# Patient Record
Sex: FEMALE | Race: WHITE | NOT HISPANIC OR LATINO | Employment: FULL TIME | URBAN - METROPOLITAN AREA
[De-identification: names, ages, dates, MRNs, and addresses within clinical notes are randomized per-mention and may not be internally consistent; named-entity substitution may affect disease eponyms.]

---

## 2017-04-02 DIAGNOSIS — E03.9 HYPOTHYROIDISM: ICD-10-CM

## 2017-04-03 NOTE — TELEPHONE ENCOUNTER
synthroid     Last Written Prescription Date: 03/16/16  Last Quantity: 90, # refills: 3  Last Office Visit with [facility], [facility] or [facility] prescribing provider: 03/16/16        TSH   Date Value Ref Range Status   03/16/2016 3.98 0.40 - 4.00 mU/L Final

## 2017-04-04 RX ORDER — LEVOTHYROXINE SODIUM 75 UG/1
TABLET ORAL
Qty: 30 TABLET | Refills: 0 | Status: SHIPPED | OUTPATIENT
Start: 2017-04-04 | End: 2017-04-19

## 2017-04-04 NOTE — TELEPHONE ENCOUNTER
Medication is being filled for 1 time refill only due to:  Patient needs labs TSH. Patient needs to be seen because it has been more than one year since last visit. please call to schedule lab and OV.    Floridalma Downing, RN, BSN

## 2017-04-05 NOTE — TELEPHONE ENCOUNTER
Patient returned call and was given message below. Patient states she will call back to make an appointment.    Thank you Vani

## 2017-04-12 NOTE — PROGRESS NOTES
"   SUBJECTIVE:     CC: Gretta Mcmahan is an 32 year old woman who presents for preventive health visit.     Healthy Habits:    Do you get at least three servings of calcium containing foods daily (dairy, green leafy vegetables, etc.)? {YES/NO, DAIRY INTAKE:352947::\"yes\"}    Amount of exercise or daily activities, outside of work: {AMOUNT EXERCISE:967819}    Problems taking medications regularly {Yes /No default:301288::\"No\"}    Medication side effects: {Yes /No default.:275999::\"No\"}    Have you had an eye exam in the past two years? {YESNOBLANK:731237}    Do you see a dentist twice per year? {YESNOBLANK:364200}    Do you have sleep apnea, excessive snoring or daytime drowsiness?{YESNOBLANK:324283}    {Outside tests to abstract? :645386}    {additional problems to add:557120}    Today's PHQ-2 Score:   PHQ-2 ( 1999 Pfizer) 3/16/2016   Q1: Little interest or pleasure in doing things 0   Q2: Feeling down, depressed or hopeless 0   PHQ-2 Score 0     {PHQ-2 LOOK IN ASSESSMENTS :930960}  Abuse: Current or Past(Physical, Sexual or Emotional)- {YES/NO/NA:081417}  Do you feel safe in your environment - {YES/NO/NA:392231}    Social History   Substance Use Topics     Smoking status: Never Smoker     Smokeless tobacco: Never Used     Alcohol use 0.0 oz/week     0 Standard drinks or equivalent per week      Comment: occasional     {ETOH AUDIT:112368}    Recent Labs   Lab Test  03/16/16   1120   CHOL  162   HDL  63   LDL  85   TRIG  68   NHDL  99       Reviewed orders with patient.  Reviewed health maintenance and updated orders accordingly - {Yes/No:687964::\"Yes\"}    Mammo Decision Support:  {Mammo:638584}    Pertinent mammograms are reviewed under the imaging tab.  History of abnormal Pap smear: {PAP HX:571677}    Reviewed and updated as needed this visit by clinical staff         Reviewed and updated as needed this visit by Provider        {HISTORY OPTIONS:230307}    ROS:  {FEMALE PREVENTATIVE " "ROS:855387}    {CHRONICPROBDATA:996225}  OBJECTIVE:     There were no vitals taken for this visit.  EXAM:  {Exam Choices:744738}    ASSESSMENT/PLAN:     {Diag Picklist:171115}    COUNSELING:   {FEMALE COUNSELING MESSAGES:753489::\"Reviewed preventive health counseling, as reflected in patient instructions\"}    {Blood Pressure Screenin}     reports that she has never smoked. She has never used smokeless tobacco.  {Tobacco Cessation needed for ACO -- Delete if patient is a non-smoker:566449}  Estimated body mass index is 22.57 kg/(m^2) as calculated from the following:    Height as of 16: 6' 0.25\" (1.835 m).    Weight as of 16: 167 lb 9.6 oz (76 kg).   {Weight Management Plan needed for ACO:152170}    Counseling Resources:  ATP IV Guidelines  Pooled Cohorts Equation Calculator  Breast Cancer Risk Calculator  FRAX Risk Assessment  ICSI Preventive Guidelines  Dietary Guidelines for Americans, 2010  USDA's MyPlate  ASA Prophylaxis  Lung CA Screening    TABBY KOHLI MD, MD  [facility] [place]  "

## 2017-04-19 ENCOUNTER — OFFICE VISIT (OUTPATIENT)
Dept: FAMILY MEDICINE | Facility: CLINIC | Age: 32
End: 2017-04-19
Payer: COMMERCIAL

## 2017-04-19 VITALS
DIASTOLIC BLOOD PRESSURE: 70 MMHG | HEIGHT: 72 IN | HEART RATE: 60 BPM | TEMPERATURE: 98.1 F | WEIGHT: 163.3 LBS | BODY MASS INDEX: 22.12 KG/M2 | SYSTOLIC BLOOD PRESSURE: 118 MMHG | RESPIRATION RATE: 16 BRPM

## 2017-04-19 DIAGNOSIS — E03.4 HYPOTHYROIDISM DUE TO ACQUIRED ATROPHY OF THYROID: ICD-10-CM

## 2017-04-19 DIAGNOSIS — Z00.00 ENCOUNTER FOR ROUTINE ADULT HEALTH EXAMINATION WITHOUT ABNORMAL FINDINGS: Primary | ICD-10-CM

## 2017-04-19 DIAGNOSIS — E04.1 THYROID NODULE: ICD-10-CM

## 2017-04-19 LAB — TSH SERPL DL<=0.005 MIU/L-ACNC: 3.29 MU/L (ref 0.4–4)

## 2017-04-19 PROCEDURE — 84443 ASSAY THYROID STIM HORMONE: CPT | Performed by: FAMILY MEDICINE

## 2017-04-19 PROCEDURE — 99395 PREV VISIT EST AGE 18-39: CPT | Performed by: FAMILY MEDICINE

## 2017-04-19 PROCEDURE — 36415 COLL VENOUS BLD VENIPUNCTURE: CPT | Performed by: FAMILY MEDICINE

## 2017-04-19 RX ORDER — CIDER VINEGAR 300 MG
TABLET ORAL
COMMUNITY
End: 2019-04-19

## 2017-04-19 RX ORDER — LEVOTHYROXINE SODIUM 75 UG/1
75 TABLET ORAL DAILY
Qty: 90 TABLET | Refills: 3 | Status: SHIPPED | OUTPATIENT
Start: 2017-04-19 | End: 2018-04-18

## 2017-04-19 ASSESSMENT — PAIN SCALES - GENERAL: PAINLEVEL: NO PAIN (0)

## 2017-04-19 NOTE — NURSING NOTE
"Chief Complaint   Patient presents with     Physical     Panel Management     CUBA        Initial /70 (BP Location: Left arm, Patient Position: Chair, Cuff Size: Adult Regular)  Pulse 60  Temp 98.1  F (36.7  C) (Temporal)  Resp 16  Ht 6' 0.48\" (1.841 m)  Wt 163 lb 4.8 oz (74.1 kg)  LMP 04/11/2017  BMI 21.86 kg/m2 Estimated body mass index is 21.86 kg/(m^2) as calculated from the following:    Height as of this encounter: 6' 0.48\" (1.841 m).    Weight as of this encounter: 163 lb 4.8 oz (74.1 kg).  Medication Reconciliation: complete  Iwona Segovia CMA (AAMA)    "

## 2017-04-19 NOTE — MR AVS SNAPSHOT
After Visit Summary   4/19/2017    Gretta Mcmahan    MRN: [id number]           Patient Information     Date Of Birth          1985        Visit Information        Provider Department      4/19/2017 10:40 AM Lavinia Roque MD [facility]        Today's Diagnoses     Encounter for routine adult health examination without abnormal findings    -  1    Thyroid nodule        Hypothyroidism due to acquired atrophy of thyroid          Care Instructions      Preventive Health Recommendations  Female Ages 26 - 39  Yearly exam:   See your health care provider every year in order to    Review health changes.     Discuss preventive care.      Review your medicines if you your doctor has prescribed any.    Until age 30: Get a Pap test every three years (more often if you have had an abnormal result).    After age 30: Talk to your doctor about whether you should have a Pap test every 3 years or have a Pap test with HPV screening every 5 years.   You do not need a Pap test if your uterus was removed (hysterectomy) and you have not had cancer.  You should be tested each year for STDs (sexually transmitted diseases), if you're at risk.   Talk to your provider about how often to have your cholesterol checked.  If you are at risk for diabetes, you should have a diabetes test (fasting glucose).  Shots: Get a flu shot each year. Get a tetanus shot every 10 years.   Nutrition:     Eat at least 5 servings of fruits and vegetables each day.    Eat whole-grain bread, whole-wheat pasta and brown rice instead of white grains and rice.    Talk to your provider about Calcium and Vitamin D.     Lifestyle    Exercise at least 150 minutes a week (30 minutes a day, 5 days of the week). This will help you control your weight and prevent disease.    Limit alcohol to one drink per day.    No smoking.     Wear sunscreen to prevent skin cancer.    See your dentist every six months for an exam and cleaning.           "Follow-ups after your visit        Who to contact     If you have questions or need follow up information about today's clinic visit or your schedule please contact [facility] directly at [phone].  Normal or non-critical lab and imaging results will be communicated to you by MyChart, letter or phone within 4 business days after the clinic has received the results. If you do not hear from us within 7 days, please contact the clinic through MyChart or phone. If you have a critical or abnormal lab result, we will notify you by phone as soon as possible.  Submit refill requests through [email or web address] or call your pharmacy and they will forward the refill request to us. Please allow 3 business days for your refill to be completed.          Additional Information About Your Visit        [employer] Information     [email or web address] gives you secure access to your electronic health record. If you see a primary care provider, you can also send messages to your care team and make appointments. If you have questions, please call your primary care clinic.  If you do not have a primary care provider, please call [phone] and they will assist you.        Care EveryWhere ID     This is your Care EveryWhere ID. This could be used by other organizations to access your [facility] medical records  [id number]        Your Vitals Were     Pulse Temperature Respirations Height Last Period BMI (Body Mass Index)    60 98.1  F (36.7  C) (Temporal) 16 6' 0.48\" (1.841 m) 04/11/2017 21.86 kg/m2       Blood Pressure from Last 3 Encounters:   04/19/17 118/70   03/16/16 124/76    Weight from Last 3 Encounters:   04/19/17 163 lb 4.8 oz (74.1 kg)   04/07/16 167 lb 9.6 oz (76 kg)   03/16/16 162 lb 6.4 oz (73.7 kg)              We Performed the Following     TSH with free T4 reflex        Primary Care Provider    No Pcp Confirmed       No address on file        Thank you!     Thank you for choosing [facility]  for your care. Our goal is " always to provide you with excellent care. Hearing back from our patients is one way we can continue to improve our services. Please take a few minutes to complete the written survey that you may receive in the mail after your visit with us. Thank you!             Your Updated Medication List - Protect others around you: Learn how to safely use, store and throw away your medicines at www.disposemymeds.org.          This list is accurate as of: 4/19/17 11:07 AM.  Always use your most recent med list.                   Brand Name Dispense Instructions for use    CALCIUM-VITAMIN D PO          Fish Oil 1000 MG Cpdr          IRON SUPPLEMENT PO      Take 325 mg by mouth daily       levothyroxine 75 MCG tablet    SYNTHROID/LEVOTHROID    30 tablet    TAKE ONE TABLET BY MOUTH ONCE DAILY

## 2017-04-19 NOTE — PROGRESS NOTES
SUBJECTIVE:     CC: Gretta Mcmahan is an 32 year old woman who presents for preventive health visit.     Physical   Annual:     Getting at least 3 servings of Calcium per day::  Yes    Bi-annual eye exam::  NO    Dental care twice a year::  Yes    Sleep apnea or symptoms of sleep apnea::  None    Diet::  Regular (no restrictions)    Frequency of exercise::  4-5 days/week    Duration of exercise::  30-45 minutes    Taking medications regularly::  Yes    Medication side effects::  None    Additional concerns today::  No        No other concerns    Today's PHQ-2 Score:   PHQ-2 ( 1999 Pfizer) 4/19/2017   Q1: Little interest or pleasure in doing things 0   Q2: Feeling down, depressed or hopeless 0   PHQ-2 Score 0   Little interest or pleasure in doing things -   Feeling down, depressed or hopeless -   PHQ-2 Score -       Abuse: Current or Past(Physical, Sexual or Emotional)- No  Do you feel safe in your environment - Yes    Social History   Substance Use Topics     Smoking status: Never Smoker     Smokeless tobacco: Never Used     Alcohol use 0.0 oz/week      Comment: occasional     The patient does not drink >3 drinks per day nor >7 drinks per week.    Recent Labs   Lab Test  03/16/16   1120   CHOL  162   HDL  63   LDL  85   TRIG  68   NHDL  99       Reviewed orders with patient.  Reviewed health maintenance and updated orders accordingly - Yes    Mammo Decision Support:  Mammogram not appropriate for this patient based on age.    Pertinent mammograms are reviewed under the imaging tab.  History of abnormal Pap smear: NO - age 30-65 PAP every 5 years with negative HPV co-testing recommended    Reviewed and updated as needed this visit by clinical staff  Tobacco  Allergies  Med Hx  Surg Hx  Fam Hx  Soc Hx        Reviewed and updated as needed this visit by Provider        HYPOTHYROIDISM - doing well. No fatigue. No weight changes. No palpitations. Due for labs.     THYROID NODULE- patient has a thyroid nodule.  "She was evaluated by Dr. Hawkins 4/2016. Recommended an ultrasound in 2 years for recheck. Patient denies any concerns.     ROS:  C: NEGATIVE for fever, chills, change in weight  I: NEGATIVE for worrisome rashes, moles or lesions  E: NEGATIVE for vision changes or irritation  ENT: NEGATIVE for ear, mouth and throat problems  R: NEGATIVE for significant cough or SOB  B: NEGATIVE for masses, tenderness or discharge  CV: NEGATIVE for chest pain, palpitations or peripheral edema  GI: NEGATIVE for nausea, abdominal pain, heartburn, or change in bowel habits  : NEGATIVE for unusual urinary or vaginal symptoms. Periods are regular.  M: NEGATIVE for significant arthralgias or myalgia  N: NEGATIVE for weakness, dizziness or paresthesias  P: NEGATIVE for changes in mood or affect    Problem list, Medication list, Allergies, and Medical/Social/Surgical histories reviewed in [facility] and updated as appropriate.  OBJECTIVE:     /70 (BP Location: Left arm, Patient Position: Chair, Cuff Size: Adult Regular)  Pulse 60  Temp 98.1  F (36.7  C) (Temporal)  Resp 16  Ht 6' 0.48\" (1.841 m)  Wt 163 lb 4.8 oz (74.1 kg)  LMP 04/11/2017  BMI 21.86 kg/m2  EXAM:  GENERAL: healthy, alert and no distress  EYES: Eyes grossly normal to inspection, PERRL and conjunctivae and sclerae normal  HENT: ear canals and TM's normal, nose and mouth without ulcers or lesions  NECK: no adenopathy, no asymmetry, masses, or scars and thyroid normal to palpation  RESP: lungs clear to auscultation - no rales, rhonchi or wheezes  BREAST: normal without masses, tenderness or nipple discharge and no palpable axillary masses or adenopathy  CV: regular rate and rhythm, normal S1 S2, no S3 or S4, no murmur, click or rub, no peripheral edema and peripheral pulses strong  ABDOMEN: soft, nontender, no hepatosplenomegaly, no masses and bowel sounds normal  MS: no gross musculoskeletal defects noted, no edema  SKIN: no suspicious lesions or rashes  NEURO: Normal " "strength and tone, mentation intact and speech normal  PSYCH: mentation appears normal, affect normal/bright    ASSESSMENT/PLAN:     1. Encounter for routine adult health examination without abnormal findings  See counseling messages     2. Thyroid nodule  Recheck thyroid ultrasound 3/2018.   Call for any concerns.     3. Hypothyroidism due to acquired atrophy of thyroid  Awaiting results. Dose adjustment as needed.    - TSH with free T4 reflex    COUNSELING:  Reviewed preventive health counseling, as reflected in patient instructions       Regular exercise       Healthy diet/nutrition       Contraception    ++consider nexplanon for contraception. She will see Floridalma Scott if she wishes to proceed.      reports that she has never smoked. She has never used smokeless tobacco.    Estimated body mass index is 21.86 kg/(m^2) as calculated from the following:    Height as of this encounter: 6' 0.48\" (1.841 m).    Weight as of this encounter: 163 lb 4.8 oz (74.1 kg).       Counseling Resources:  ATP IV Guidelines  Pooled Cohorts Equation Calculator  Breast Cancer Risk Calculator  FRAX Risk Assessment  ICSI Preventive Guidelines  Dietary Guidelines for Americans, 2010  USDA's MyPlate  ASA Prophylaxis  Lung CA Screening    TABBY KOHLI MD, MD  [facility]  "

## 2017-07-06 ENCOUNTER — VIRTUAL VISIT (OUTPATIENT)
Dept: FAMILY MEDICINE | Facility: OTHER | Age: 32
End: 2017-07-06

## 2017-07-11 NOTE — PROGRESS NOTES
"Date:   Clinician: Keren Mckenna  Clinician NPI: [id number]  Patient: IGOR DELA CRUZ  Patient : 1985  Patient Address: [address] [place] [address]  Patient Phone: [phone]  Visit Protocol: UTI  Patient Summary:  IGOR is a 32 year old ( : 1985 ) female who initiated a Visit for a presumed bladder infection. When asked the question \"Please sign me up to receive news, health information and promotions. \", IGOR responded \"No\".    Her symptoms began yesterday and consist of urinary frequency, foul smelling urine, urinary incontinence, urgency, and dysuria.   Symptom Details   Urinary Frequency: Several times each hour    She denies flank pain, hematuria, vaginal discharge, abdominal pain, recent antibiotic use, nausea, loss of appetite, chills, fever, hesitation, and vomiting. IGOR has never had kidney stones. She has not been hospitalized, been a patient in a nursing home, or had a catheter in the past two weeks. She denies risk factors for sexually transmitted infections.   IGOR has not had any UTIs in the past 12 months. Her current symptoms are similar to the previous UTI symptoms. She took an antibiotic for her last infection but does not remember which one.   IGOR does not get yeast infections when she takes antibiotics.   She states she is not pregnant and denies breastfeeding. She has menstruated in the past month.   She does NOT smoke or use smokeless tobacco.   MEDICATIONS:  Thyroid  , ALLERGIES:  NKDA   Clinician Response:  Dear IGOR,  Based on the information you have provided, you likely have a bladder infection, also called an acute urinary tract infection (UTI).   To treat your infection, I am prescribing:   Nitrofurantoin (Macrobid). Swallow one (1) tablet twice a day for 5 days. Take the tablet with food. Continue taking the tablets even if you feel better before all the medication is gone. There is no refill with this prescription.   Some people " develop allergies to antibiotics. If you notice a new rash, significant swelling, or difficulty breathing, stop the medication immediately and go into a clinic for physical evaluation.   Unless you are allergic to the following over-the-counter medication, I recommend using phenazopyridine (AZO, Uristat, or store brand) oral tablet to treat your discomfort with urination. Swallow two (2) tablets three times a day for up to 2 days. Take the pills with a full glass of water after a meal. You will notice that this medication adds an orange/red color to your urine, which may stain fabric. Your contact lenses may also stain if handled after touching the tablets. If your skin or the whites of your eyes develop a yellowish color, it may indicate that your kidneys are not correctly removing the medication. Although this is uncommon, stop using the medication and immediately contact your clinic if this happens. This is an over-the-counter medication that does not require a prescription.   To help treat your current UTI and prevent future occurrences, remember to:     Drink 8-10, 8-ounce glasses of water daily.    Urinate after sexual intercourse.    Wipe front to back after using the bathroom.     Some women may develop a yeast infection as a side effect of taking antibiotics. If you notice symptoms of a yeast infection, [facility] can help treat that condition as well. Simply log in and complete another Visit, which will cover all of the necessary questions to determine the best treatment for you.   You should visit a clinic for a follow-up visit if your symptoms do not improve in 1-2 days or if you experience another urinary tract infection soon after completing this treatment.  If you become pregnant during this course of treatment, stop taking the medication and contact your primary care provider.   Diagnosis: Acute Uncomplicated Bladder Infection  Diagnosis ICD: N39.0  Additional Clinician Notes: Take all of your  antibiotics. You may take the over the counter Azo to help with symptoms for a day or 2, remember this will turn your urine orange. Drink plenty of water. Cranberry juice may help.    Prescription: nitrofurantoin (Macrobid) 100mg oral tablet 10 tablets, 5 days supply. Take one tablet by mouth two times a day for 5 days. Refills: 0, Refill as needed: no, Allow substitutions: yes

## 2017-11-20 NOTE — PROGRESS NOTES
SUBJECTIVE:                                                    Gretat Mcmahan is a 32 year old female who presents to clinic today for the following health issues:          HPI     Patient reports for Nexplanon consultation and placement. She is sexually active, and had sexual intercourse on 11-. Patient is not on birth control. She is right handed. Patient was referred by Dr. Roque. Patient would like to use this for birth control and not for period control. Her last menstrual period was on 11-, and she is having regular periods. She denies having an allergy to Lidocaine or Novocain.     Immunizations  Patient got her Flu shot already.     Social History  She got  6 months ago.     Problem list and histories reviewed & adjusted, as indicated.  Additional history: as documented      Patient Active Problem List   Diagnosis     Hypothyroidism due to acquired atrophy of thyroid     CARDIOVASCULAR SCREENING; LDL GOAL LESS THAN 160     Thyroid nodule     Past Surgical History:   Procedure Laterality Date     DENTAL SURGERY         Social History   Substance Use Topics     Smoking status: Never Smoker     Smokeless tobacco: Never Used     Alcohol use 0.0 oz/week      Comment: occasional     Family History   Problem Relation Age of Onset     DIABETES Maternal Grandmother      DIABETES Paternal Grandmother      Hyperlipidemia Mother      Genetic Disorder Mother      prothrombin gene mutation     Thyroid Disease Mother      Genetic Disorder Sister      carrier         Current Outpatient Prescriptions   Medication Sig Dispense Refill     etonogestrel (IMPLANON/NEXPLANON) 68 MG IMPL 1 each (68 mg) by Subdermal route continuous  0     Omega-3 Fatty Acids (FISH OIL) 1000 MG CPDR        levothyroxine (SYNTHROID/LEVOTHROID) 75 MCG tablet Take 1 tablet (75 mcg) by mouth daily 90 tablet 3     Ferrous Sulfate (IRON SUPPLEMENT PO) Take 325 mg by mouth daily       CALCIUM-VITAMIN D PO        No Known  "Allergies      ROS:  Constitutional, neuro, ENT, endocrine, pulmonary, cardiac, gastrointestinal, genitourinary, musculoskeletal, integument and psychiatric systems are negative, except as otherwise noted.    This document serves as a record of the services and decisions personally performed and made by Floridalma Scott DNP. It was created on her behalf by Sarah Lua, a trained medical scribe. The creation of this document is based on the provider's statements to the medical scribe.  Sarah Lua 10:17 AM November 27, 2017    OBJECTIVE:                                                    /79  Pulse 68  Temp 98.2  F (36.8  C) (Oral)  Resp 16  Ht 6' 0.48\" (1.841 m)  Wt 165 lb 1.6 oz (74.9 kg)  LMP 11/16/2017 (Exact Date)  SpO2 99%  BMI 22.1 kg/m2  Body mass index is 22.1 kg/(m^2).  GENERAL APPEARANCE: healthy, alert and no distress  MS: extremities normal- no gross deformities noted  SKIN: no suspicious lesions or rashes  NEURO: Normal strength and tone, mentation intact and speech normal  PSYCH: mentation appears normal and affect normal/bright    Diagnostic test results:  Diagnostic Test Results:  Results for orders placed or performed in visit on 11/27/17 (from the past 24 hour(s))   Beta HCG qual IFA urine - [facility] and [facility] [place]   Result Value Ref Range    Beta HCG Qual IFA Urine Negative NEG^Negative             ASSESSMENT/PLAN:                                                        ICD-10-CM    1. Nexplanon insertion Z30.017 Beta HCG qual IFA urine - [facility] and [place]     ETONOGESTREL IMPLANT SYSTEM     etonogestrel (IMPLANON/NEXPLANON) 68 MG IMPL     INSERTION NON-BIODEGRADABLE DRUG DELIVERY IMPLANT        Consulted patient on Nexplanon placement. Written consent was given by patient for Nexplanon placement today. Beta HCG qual IFA urine analysis completed today, and results were negative.     Procedure: Nexplanon inserted easily.   Appropriate left arm cleansing with betadine or alcohol, then " local anaesthesia administered to site: lidocaine 1% with epinephrine.  Device placed 8 cm proximal from medial epicondyle and device inserted to 4 cm, and completely embedded and not visible at insertion site. Palpation for placement confirmed. Pressure dressing applied.  Instructions reviewed with patient regarding checking the wound for bleeding and signs/symptoms of infection.   Barrier method for 1 weeks, device expires in 3 years.  Return to clinic with any new or worsening symptoms, and as needed.    Advised patient to use condoms. Warned patient that Nexplanon does not given her protection from STDs.     Directed patient to take a pregnancy test in 2 weeks, return to clinic if test is positive. Discussed risk if pregnant and on Nexplanon.     Follow up with Provider - Return to clinic with any new or worsening symptoms, and as needed.     All questions invited, asked and answered to the patient's apparent satisfaction.  Patient agrees to plan.     The information in this document, created by the medical scribe for me, accurately reflects the services I personally performed and the decisions made by me. I have reviewed and approved this document for accuracy prior to leaving the patient care area.  November 27, 2017 10:44 AM    MICHELET Gandara [facility] VANNESA

## 2017-11-27 ENCOUNTER — OFFICE VISIT (OUTPATIENT)
Dept: FAMILY MEDICINE | Facility: CLINIC | Age: 32
End: 2017-11-27
Payer: COMMERCIAL

## 2017-11-27 VITALS
DIASTOLIC BLOOD PRESSURE: 79 MMHG | SYSTOLIC BLOOD PRESSURE: 126 MMHG | WEIGHT: 165.1 LBS | HEIGHT: 72 IN | BODY MASS INDEX: 22.36 KG/M2 | HEART RATE: 68 BPM | OXYGEN SATURATION: 99 % | TEMPERATURE: 98.2 F | RESPIRATION RATE: 16 BRPM

## 2017-11-27 DIAGNOSIS — Z30.017 NEXPLANON INSERTION: Primary | ICD-10-CM

## 2017-11-27 LAB — BETA HCG QUAL IFA URINE: NEGATIVE

## 2017-11-27 PROCEDURE — 99207 ZZC NO CHARGE LOS: CPT | Mod: 25 | Performed by: NURSE PRACTITIONER

## 2017-11-27 PROCEDURE — 84703 CHORIONIC GONADOTROPIN ASSAY: CPT | Performed by: NURSE PRACTITIONER

## 2017-11-27 PROCEDURE — 11981 INSERTION DRUG DLVR IMPLANT: CPT | Performed by: NURSE PRACTITIONER

## 2017-11-27 ASSESSMENT — PAIN SCALES - GENERAL: PAINLEVEL: NO PAIN (0)

## 2017-11-27 NOTE — NURSING NOTE
"Chief Complaint   Patient presents with     IUD     Panel Management     flu shot       Initial /79  Pulse 68  Temp 98.2  F (36.8  C) (Oral)  Resp 16  Ht 6' 0.48\" (1.841 m)  Wt 165 lb 1.6 oz (74.9 kg)  LMP 11/16/2017 (Exact Date)  SpO2 99%  BMI 22.1 kg/m2 Estimated body mass index is 22.1 kg/(m^2) as calculated from the following:    Height as of this encounter: 6' 0.48\" (1.841 m).    Weight as of this encounter: 165 lb 1.6 oz (74.9 kg).  Medication Reconciliation: complete     Chief Complaint   Patient presents with     IUD     Panel Management     flu shot       Initial /79  Pulse 68  Temp 98.2  F (36.8  C) (Oral)  Resp 16  Ht 6' 0.48\" (1.841 m)  Wt 165 lb 1.6 oz (74.9 kg)  LMP 11/16/2017 (Exact Date)  SpO2 99%  BMI 22.1 kg/m2 Estimated body mass index is 22.1 kg/(m^2) as calculated from the following:    Height as of this encounter: 6' 0.48\" (1.841 m).    Weight as of this encounter: 165 lb 1.6 oz (74.9 kg).  Medication Reconciliation: complete      "

## 2017-11-27 NOTE — MR AVS SNAPSHOT
After Visit Summary   11/27/2017    Gretta Muñoz    MRN: [id number]           Patient Information     Date Of Birth          1985        Visit Information        Provider Department      11/27/2017 9:40 AM Floridalma Scott APRN CNP [facility]        Today's Diagnoses     Nexplanon insertion    -  1      Care Instructions    Take at home pregnancy test in 2 weeks, return to clinic if test is positive or if having new or worsening symptoms.           Follow-ups after your visit        Follow-up notes from your care team     Return if symptoms worsen or fail to improve.      Who to contact     If you have questions or need follow up information about today's clinic visit or your schedule please contact [facility] directly at [phone].  Normal or non-critical lab and imaging results will be communicated to you by [employer], letter or phone within 4 business days after the clinic has received the results. If you do not hear from us within 7 days, please contact the clinic through [employer] or phone. If you have a critical or abnormal lab result, we will notify you by phone as soon as possible.  Submit refill requests through [employer] or call your pharmacy and they will forward the refill request to us. Please allow 3 business days for your refill to be completed.          Additional Information About Your Visit        MyChart Information     [employer] gives you secure access to your electronic health record. If you see a primary care provider, you can also send messages to your care team and make appointments. If you have questions, please call your primary care clinic.  If you do not have a primary care provider, please call [phone] and they will assist you.        Care EveryWhere ID     This is your Care EveryWhere ID. This could be used by other organizations to access your [place] medical records  [id number]        Your Vitals Were     Pulse Temperature Respirations  "Height Last Period Pulse Oximetry    68 98.2  F (36.8  C) (Oral) 16 6' 0.48\" (1.841 m) 11/16/2017 (Exact Date) 99%    BMI (Body Mass Index)                   22.1 kg/m2            Blood Pressure from Last 3 Encounters:   11/27/17 126/79   04/19/17 118/70   03/16/16 124/76    Weight from Last 3 Encounters:   11/27/17 165 lb 1.6 oz (74.9 kg)   04/19/17 163 lb 4.8 oz (74.1 kg)   04/07/16 167 lb 9.6 oz (76 kg)              We Performed the Following     Beta HCG qual IFA urine - [facility] and [place]     ETONOGESTREL IMPLANT SYSTEM     INSERTION NON-BIODEGRADABLE DRUG DELIVERY IMPLANT          Today's Medication Changes          These changes are accurate as of: 11/27/17  5:15 PM.  If you have any questions, ask your nurse or doctor.               Start taking these medicines.        Dose/Directions    etonogestrel 68 MG Impl   Commonly known as:  IMPLANON/NEXPLANON   Used for:  Nexplanon insertion   Started by:  Floridalma Scott, MICHELET CNP        Dose:  1 each   1 each (68 mg) by Subdermal route continuous   Refills:  0            Where to get your medicines      Some of these will need a paper prescription and others can be bought over the counter.  Ask your nurse if you have questions.     You don't need a prescription for these medications     etonogestrel 68 MG Impl                Primary Care Provider    None Specified       No primary provider on file.        Equal Access to Services     [facility] : Hadii valdez hensono Sobaltazar, waaxda luqadaha, qaybta kaalmada adeegyada, horacio mehta . So [facility] [phone].    ATENCIÓN: Si habla español, tiene a kovacs disposición servicios gratuitos de asistencia lingüística. Llame al [phone].    We comply with applicable federal civil rights laws and [place] laws. We do not discriminate on the basis of race, color, national origin, age, disability, sex, sexual orientation, or gender identity.            Thank you!     Thank you for choosing " [facility]  for your care. Our goal is always to provide you with excellent care. Hearing back from our patients is one way we can continue to improve our services. Please take a few minutes to complete the written survey that you may receive in the mail after your visit with us. Thank you!             Your Updated Medication List - Protect others around you: Learn how to safely use, store and throw away your medicines at www.disposemymeds.org.          This list is accurate as of: 11/27/17  5:15 PM.  Always use your most recent med list.                   Brand Name Dispense Instructions for use Diagnosis    CALCIUM-VITAMIN D PO           etonogestrel 68 MG Impl    IMPLANON/NEXPLANON     1 each (68 mg) by Subdermal route continuous    Nexplanon insertion       Fish Oil 1000 MG Cpdr           IRON SUPPLEMENT PO      Take 325 mg by mouth daily        levothyroxine 75 MCG tablet    SYNTHROID/LEVOTHROID    90 tablet    Take 1 tablet (75 mcg) by mouth daily    Hypothyroidism due to acquired atrophy of thyroid

## 2018-04-11 NOTE — PROGRESS NOTES
"  SUBJECTIVE:   Gretta Muñoz is a 33 year old female who presents to clinic today for the following health issues:      History of Present Illness     Hypothyroidism:     Since last visit, patient describes the following symptoms::  Fatigue    Diet:  Regular (no restrictions)  Frequency of exercise:  4-5 days/week  Duration of exercise:  30-45 minutes  Taking medications regularly:  Yes  Medication side effects:  None  Additional concerns today:  No    Patient was last seen one year ago for her thyroid check. She is currently on Synthroid 75mcg daily. She reports feeling increased fatigue and irritable. She is unsure if the weather, thyroid, or her Nexplanon is causing her to feel this way. She is also experiencing hot flashes as well. She has a Nexplanon as a contraceptive that was inserted last year and is tolerating it well. She denies feeling lumps in her throat.    Lip \"quiver\":  Patient reports that for the past few months, she noticed her lip \"quivers/vibrates\". She states no one else notices this except for her. This does not occur when she is talking. There is no pain. This happens multiple times per week. She denies any prior injury. She denies changes with her speech or numbness. She did have her routine dental visit two weeks ago and states everything went fine.    Problem list and histories reviewed & adjusted, as indicated.  Additional history: as documented  Patient Active Problem List   Diagnosis     Hypothyroidism due to acquired atrophy of thyroid     CARDIOVASCULAR SCREENING; LDL GOAL LESS THAN 160     Thyroid nodule     Past Surgical History:   Procedure Laterality Date     DENTAL SURGERY         Social History   Substance Use Topics     Smoking status: Never Smoker     Smokeless tobacco: Never Used     Alcohol use 0.0 oz/week      Comment: occasional     Family History   Problem Relation Age of Onset     DIABETES Maternal Grandmother      DIABETES Paternal Grandmother      Hyperlipidemia " "Mother      Genetic Disorder Mother      prothrombin gene mutation     Thyroid Disease Mother      Genetic Disorder Sister      carrier         BP Readings from Last 3 Encounters:   04/18/18 110/78   11/27/17 126/79   04/19/17 118/70    Wt Readings from Last 3 Encounters:   04/18/18 161 lb 11.2 oz (73.3 kg)   11/27/17 165 lb 1.6 oz (74.9 kg)   04/19/17 163 lb 4.8 oz (74.1 kg)                    ROS:  CONSTITUTIONAL: NEGATIVE for fever, chills, change in weight  ENT/MOUTH: NEGATIVE for ear, mouth and throat problems  RESP: NEGATIVE for significant cough or SOB  CV: NEGATIVE for chest pain, palpitations or peripheral edema  ENDOCRINE: NEGATIVE for temperature intolerance, skin/hair changes    This document serves as a record of the services and decisions personally performed and made by Lavinia Roque MD. It was created on her behalf by Jyotsna Tracy, a trained medical scribe. The creation of this document is based the provider's statements to the medical scribe.    Jyotsna Tracy April 18, 2018 10:48 AM    OBJECTIVE:   /78  Pulse 56  Temp 98.8  F (37.1  C) (Temporal)  Resp 16  Ht 6' 0.44\" (1.84 m)  Wt 161 lb 11.2 oz (73.3 kg)  LMP 03/28/2018 (Approximate)  SpO2 100%  Breastfeeding? No  BMI 21.66 kg/m2  Body mass index is 21.66 kg/(m^2).  GENERAL: healthy, alert and no distress  HENT: oropharynx clear and oral mucous membranes moist  NECK: no adenopathy, no asymmetry, masses, or scars and thyroid normal to palpation  RESP: lungs clear to auscultation - no rales, rhonchi or wheezes  CV: regular rate and rhythm, normal S1 S2, no S3 or S4, no murmur, click or rub, no peripheral edema and peripheral pulses strong  NEURO: Normal strength and tone, mentation intact and speech normal    Diagnostic Test Results:  No results found for this or any previous visit (from the past 24 hour(s)).    ASSESSMENT/PLAN:   1. Hypothyroidism due to acquired atrophy of thyroid  Will check TSH labs today and notify with " "results.  Continue Synthroid 75mcg medication.    - TSH with free T4 reflex    2. Thyroid nodule  Will recheck ultrasound - scheduled today.    - US Thyroid; Future    Lip \"quiver\":  Unkown etiology. Reviewed possible causes of this.  Discussed watchful monitoring and discussed that this should resolve on it's own.  Warning signs such as numbness or speech changes discussed    All questions invited, asked and answered to the patient's apparent satisfaction.  Patient agrees to plan.     Follow-up - with PCP as needed.    The information in this document, created by the medical scribe for me, accurately reflects the services I personally performed and the decisions made by me. I have reviewed and approved this document for accuracy prior to leaving the patient care area.    TABBY KOHLI MD  [facility]  Answers for HPI/ROS submitted by the patient on 4/15/2018   PHQ-2 Score: 0    "

## 2018-04-18 ENCOUNTER — OFFICE VISIT (OUTPATIENT)
Dept: FAMILY MEDICINE | Facility: CLINIC | Age: 33
End: 2018-04-18
Payer: COMMERCIAL

## 2018-04-18 VITALS
TEMPERATURE: 98.8 F | WEIGHT: 161.7 LBS | RESPIRATION RATE: 16 BRPM | HEART RATE: 56 BPM | OXYGEN SATURATION: 100 % | SYSTOLIC BLOOD PRESSURE: 110 MMHG | HEIGHT: 72 IN | BODY MASS INDEX: 21.9 KG/M2 | DIASTOLIC BLOOD PRESSURE: 78 MMHG

## 2018-04-18 DIAGNOSIS — E03.4 HYPOTHYROIDISM DUE TO ACQUIRED ATROPHY OF THYROID: Primary | ICD-10-CM

## 2018-04-18 DIAGNOSIS — E04.1 THYROID NODULE: ICD-10-CM

## 2018-04-18 DIAGNOSIS — R68.89 LIP SYMPTOM: ICD-10-CM

## 2018-04-18 LAB — TSH SERPL DL<=0.005 MIU/L-ACNC: 2.64 MU/L (ref 0.4–4)

## 2018-04-18 PROCEDURE — 99214 OFFICE O/P EST MOD 30 MIN: CPT | Performed by: FAMILY MEDICINE

## 2018-04-18 PROCEDURE — 84443 ASSAY THYROID STIM HORMONE: CPT | Performed by: FAMILY MEDICINE

## 2018-04-18 PROCEDURE — 36415 COLL VENOUS BLD VENIPUNCTURE: CPT | Performed by: FAMILY MEDICINE

## 2018-04-18 RX ORDER — LEVOTHYROXINE SODIUM 75 UG/1
75 TABLET ORAL DAILY
Qty: 90 TABLET | Refills: 3 | Status: SHIPPED | OUTPATIENT
Start: 2018-04-18 | End: 2019-04-22

## 2018-04-18 ASSESSMENT — PAIN SCALES - GENERAL: PAINLEVEL: NO PAIN (0)

## 2018-04-18 NOTE — MR AVS SNAPSHOT
After Visit Summary   4/18/2018    Gretta Muñoz    MRN: [id number]           Patient Information     Date Of Birth          1985        Visit Information        Provider Department      4/18/2018 10:20 AM Lavinia Roque MD [facility]        Today's Diagnoses     Hypothyroidism due to acquired atrophy of thyroid    -  1    Thyroid nodule           Follow-ups after your visit        Follow-up notes from your care team     Return in about 1 year (around 4/18/2019) for Recheck.      Your next 10 appointments already scheduled     Apr 30, 2018 10:30 AM CDT   US THYROID with MGUS1, MG US Emb[facility]   [facility]    [id number] [place] [id number] [phone]           Please bring a list of your medicines (including vitamins, minerals and over-the-counter drugs). Also, tell your doctor about any allergies you may have. Wear comfortable clothes and leave your valuables at home.  You do not need to do anything special to prepare for your exam.  Please call the Imaging Department at your exam site with any questions.              Future tests that were ordered for you today     Open Future Orders        Priority Expected Expires Ordered    US Thyroid Routine  4/18/2019 4/18/2018            Who to contact     If you have questions or need follow up information about today's clinic visit or your schedule please contact [facility] directly at [phone].  Normal or non-critical lab and imaging results will be communicated to you by MyChart, letter or phone within 4 business days after the clinic has received the results. If you do not hear from us within 7 days, please contact the clinic through MyChart or phone. If you have a critical or abnormal lab result, we will notify you by phone as soon as possible.  Submit refill requests through [employer] or call your pharmacy and they will forward the refill request to us.  "Please allow 3 business days for your refill to be completed.          Additional Information About Your Visit        MyChart Information     [employer] gives you secure access to your electronic health record. If you see a primary care provider, you can also send messages to your care team and make appointments. If you have questions, please call your primary care clinic.  If you do not have a primary care provider, please call [phone] and they will assist you.        Care EveryWhere ID     This is your Care EveryWhere ID. This could be used by other organizations to access your [place] medical records  [id number]        Your Vitals Were     Pulse Temperature Respirations Height Last Period Pulse Oximetry    56 98.8  F (37.1  C) (Temporal) 16 6' 0.44\" (1.84 m) 03/28/2018 (Approximate) 100%    Breastfeeding? BMI (Body Mass Index)                No 21.66 kg/m2           Blood Pressure from Last 3 Encounters:   04/18/18 110/78   11/27/17 126/79   04/19/17 118/70    Weight from Last 3 Encounters:   04/18/18 161 lb 11.2 oz (73.3 kg)   11/27/17 165 lb 1.6 oz (74.9 kg)   04/19/17 163 lb 4.8 oz (74.1 kg)              We Performed the Following     TSH with free T4 reflex        Primary Care Provider Office Phone # Fax #    Lavinia Roque [phone] [address] [facility] [address]        Equal Access to Services     [facility]: Hadii aad ku hadasho Soomaali, waaxda luqadaha, qaybta kaalmada adeegyada, waxay toya leblanc adehitesh miller'aan ah. So [facility] [phone].    ATENCIÓN: Si habla español, tiene a kovacs disposición servicios gratuitos de asistencia lingüística. Llame al [phone].    We comply with applicable federal civil rights laws and [place] laws. We do not discriminate on the basis of race, color, national origin, age, disability, sex, sexual orientation, or gender identity.            Thank you!     Thank you for choosing [facility]  for your care. Our " goal is always to provide you with excellent care. Hearing back from our patients is one way we can continue to improve our services. Please take a few minutes to complete the written survey that you may receive in the mail after your visit with us. Thank you!             Your Updated Medication List - Protect others around you: Learn how to safely use, store and throw away your medicines at www.disposemymeds.org.          This list is accurate as of 4/18/18 11:02 AM.  Always use your most recent med list.                   Brand Name Dispense Instructions for use Diagnosis    CALCIUM-VITAMIN D PO           etonogestrel 68 MG Impl    IMPLANON/NEXPLANON     1 each (68 mg) by Subdermal route continuous    Nexplanon insertion       Fish Oil 1000 MG Cpdr           IRON SUPPLEMENT PO      Take 325 mg by mouth daily        levothyroxine 75 MCG tablet    SYNTHROID/LEVOTHROID    90 tablet    Take 1 tablet (75 mcg) by mouth daily    Hypothyroidism due to acquired atrophy of thyroid

## 2018-04-30 ENCOUNTER — RADIANT APPOINTMENT (OUTPATIENT)
Dept: ULTRASOUND IMAGING | Facility: CLINIC | Age: 33
End: 2018-04-30
Attending: FAMILY MEDICINE
Payer: COMMERCIAL

## 2018-04-30 DIAGNOSIS — E04.1 THYROID NODULE: ICD-10-CM

## 2018-04-30 PROCEDURE — 76536 US EXAM OF HEAD AND NECK: CPT | Performed by: RADIOLOGY

## 2018-09-28 LAB
ALT SERPL-CCNC: 22 IU/L (ref 12–68)
AST SERPL-CCNC: 21 IU/L (ref 15–37)
CHOLEST SERPL-MCNC: 154 MG/DL
CREAT SERPL-MCNC: 0.87 MG/DL (ref 0.6–1)
GFR SERPL CREATININE-BSD FRML MDRD: >60 ML/MIN
GLUCOSE SERPL-MCNC: 94 MG/DL (ref 70–110)
HDLC SERPL-MCNC: 68 MG/DL
LDLC SERPL CALC-MCNC: 76 MG/DL
POTASSIUM SERPL-SCNC: 4 MMOL/L (ref 3.5–5.1)
TRIGL SERPL-MCNC: 49 MG/DL

## 2019-04-12 NOTE — PROGRESS NOTES
"  SUBJECTIVE:   Gretta Muñoz is a 34 year old female who presents to clinic today for the following health issues:    History of Present Illness     Hypothyroidism:     Since last visit, patient describes the following symptoms::  None    Diet:  Regular (no restrictions)  Frequency of exercise:  4-5 days/week  Duration of exercise:  30-45 minutes  Taking medications regularly:  Yes  Additional concerns today:  No    Thyroid   The patient states that she thinks she is \"alright\". She states that she notices a drop in her temperature after working. She states that her temperature is usually 94-95 degrees Farenheit. She notes that after drinking something warm, her body temperature will go back to normal.   She notes that she lost some weight, but that might be due to her weight training plan.     Left side neck swelling   The patient notes that the left side of neck is swollen. She is unsure if this is due to her thyroid.     Sinus Bradycardia   The patient states that she has Sinus Bradycardia. She notes that this is due to her being athletic.     Additional history: as documented    Reviewed and updated as needed this visit by clinical staff         Reviewed and updated as needed this visit by Provider       Patient Active Problem List   Diagnosis     Hypothyroidism due to acquired atrophy of thyroid     CARDIOVASCULAR SCREENING; LDL GOAL LESS THAN 160     Thyroid nodule     Past Surgical History:   Procedure Laterality Date     DENTAL SURGERY         Social History     Tobacco Use     Smoking status: Never Smoker     Smokeless tobacco: Never Used   Substance Use Topics     Alcohol use: Yes     Alcohol/week: 0.0 oz     Comment: occasional     Family History   Problem Relation Age of Onset     Diabetes Maternal Grandmother      Diabetes Paternal Grandmother      Hyperlipidemia Mother      Genetic Disorder Mother         prothrombin gene mutation     Thyroid Disease Mother      Genetic Disorder Sister         " "carrier         Current Outpatient Medications   Medication Sig Dispense Refill     CALCIUM-VITAMIN D PO        etonogestrel (IMPLANON/NEXPLANON) 68 MG IMPL 1 each (68 mg) by Subdermal route continuous  0     Ferrous Sulfate (IRON SUPPLEMENT PO) Take 325 mg by mouth daily       levothyroxine (SYNTHROID/LEVOTHROID) 75 MCG tablet Take 1 tablet (75 mcg) by mouth daily 90 tablet 3     No Known Allergies  Recent Labs   Lab Test 04/18/18  1107 04/19/17  1313 03/16/16  1120   LDL  --   --  85   HDL  --   --  63   TRIG  --   --  68   TSH 2.64 3.29 3.98      BP Readings from Last 3 Encounters:   04/19/19 112/78   04/18/18 110/78   11/27/17 126/79    Wt Readings from Last 3 Encounters:   04/19/19 75.3 kg (166 lb 1.6 oz)   04/18/18 73.3 kg (161 lb 11.2 oz)   11/27/17 74.9 kg (165 lb 1.6 oz)        ROS:  CONSTITUTIONAL: NEGATIVE for fever, chills, change in weight  ENT/MOUTH: NEGATIVE for ear, mouth and throat problems  RESP: NEGATIVE for significant cough or SOB  CV: NEGATIVE for chest pain, palpitations or peripheral edema  ENDOCRINE: NEGATIVE for temperature intolerance, skin/hair changes; POSITIVE for temperature changes   MS: POSITIVE for swollen neck    This document serves as a record of the services and decisions personally performed and made by Lavinia Roque MD. It was created on her behalf by Edilia Hernandez, a trained medical scribe. The creation of this document is based the provider's statements to the medical scribe.    Edilia Hernandez April 19, 2019 1:01 PM  OBJECTIVE:     /78   Pulse 84   Temp 100.7  F (38.2  C) (Temporal)   Resp 16   Ht 1.854 m (6' 1\")   Wt 75.3 kg (166 lb 1.6 oz)   SpO2 97%   BMI 21.91 kg/m    Body mass index is 21.91 kg/m .  GENERAL: healthy, alert and no distress  HENT: mouth without ulcers or lesions  NECK: no adenopathy, no asymmetry, masses, or scars, mild enlargement of thyroid which is unchanged   RESP: lungs clear to auscultation - no rales, rhonchi or wheezes  CV: " "regular rate and rhythm, normal S1 S2, no S3 or S4, no murmur, click or rub, no peripheral edema and peripheral pulses strong    Diagnostic Test Results:  No results found for this or any previous visit (from the past 24 hour(s)).    ASSESSMENT/PLAN:   1. Hypothyroidism due to acquired atrophy of thyroid  Patient reports temperature after working out is between 94-95 degrees Farenheit.   She reports that \"drinking something warm will bring her temperature back to normal\".  Patient reports losing weight- due to weight training plan.   Patient will be due for Thyroid Ultrasound in 1 year.   Doing well. Well controlled. Tolerating medication.  No change in plan.   Labs have been ordered.  Awaiting results.   Refills will be sent once lab results come in.   Dose adjustment as needed.   - TSH with free T4 reflex    2. History of neck swelling  Patient reports left side of neck is swollen.   Exam showed mild enlargement of thyroid which is unchanged.  Labs have been ordered.  Will notify with results.   If labs return normal, ultrasound of thyroid may be ordered sooner.   - CBC with platelets and differential    Follow up- Come back in 1 year for recheck.     The information in this document, created by the medical scribe for me, accurately reflects the services I personally performed and the decisions made by me. I have reviewed and approved this document for accuracy prior to leaving the patient care area.    TABBY KOHLI MD, MD  [facility]  "

## 2019-04-19 ENCOUNTER — OFFICE VISIT (OUTPATIENT)
Dept: FAMILY MEDICINE | Facility: CLINIC | Age: 34
End: 2019-04-19
Payer: COMMERCIAL

## 2019-04-19 VITALS
HEIGHT: 72 IN | SYSTOLIC BLOOD PRESSURE: 112 MMHG | DIASTOLIC BLOOD PRESSURE: 78 MMHG | OXYGEN SATURATION: 97 % | RESPIRATION RATE: 16 BRPM | TEMPERATURE: 100.7 F | WEIGHT: 166.1 LBS | BODY MASS INDEX: 22.5 KG/M2 | HEART RATE: 84 BPM

## 2019-04-19 DIAGNOSIS — E03.4 HYPOTHYROIDISM DUE TO ACQUIRED ATROPHY OF THYROID: Primary | ICD-10-CM

## 2019-04-19 DIAGNOSIS — Z87.898 HISTORY OF NECK SWELLING: ICD-10-CM

## 2019-04-19 LAB
BASOPHILS # BLD AUTO: 0 10E9/L (ref 0–0.2)
BASOPHILS NFR BLD AUTO: 0.5 %
DIFFERENTIAL METHOD BLD: NORMAL
EOSINOPHIL # BLD AUTO: 0.1 10E9/L (ref 0–0.7)
EOSINOPHIL NFR BLD AUTO: 1.1 %
ERYTHROCYTE [DISTWIDTH] IN BLOOD BY AUTOMATED COUNT: 13.2 % (ref 10–15)
HCT VFR BLD AUTO: 38.9 % (ref 35–47)
HGB BLD-MCNC: 12.6 G/DL (ref 11.7–15.7)
LYMPHOCYTES # BLD AUTO: 1.5 10E9/L (ref 0.8–5.3)
LYMPHOCYTES NFR BLD AUTO: 27.3 %
MCH RBC QN AUTO: 29.5 PG (ref 26.5–33)
MCHC RBC AUTO-ENTMCNC: 32.4 G/DL (ref 31.5–36.5)
MCV RBC AUTO: 91 FL (ref 78–100)
MONOCYTES # BLD AUTO: 0.3 10E9/L (ref 0–1.3)
MONOCYTES NFR BLD AUTO: 5.4 %
NEUTROPHILS # BLD AUTO: 3.6 10E9/L (ref 1.6–8.3)
NEUTROPHILS NFR BLD AUTO: 65.7 %
PLATELET # BLD AUTO: 166 10E9/L (ref 150–450)
RBC # BLD AUTO: 4.27 10E12/L (ref 3.8–5.2)
TSH SERPL DL<=0.005 MIU/L-ACNC: 1.99 MU/L (ref 0.4–4)
WBC # BLD AUTO: 5.5 10E9/L (ref 4–11)

## 2019-04-19 PROCEDURE — 84443 ASSAY THYROID STIM HORMONE: CPT | Performed by: FAMILY MEDICINE

## 2019-04-19 PROCEDURE — 99214 OFFICE O/P EST MOD 30 MIN: CPT | Performed by: FAMILY MEDICINE

## 2019-04-19 PROCEDURE — 36415 COLL VENOUS BLD VENIPUNCTURE: CPT | Performed by: FAMILY MEDICINE

## 2019-04-19 PROCEDURE — 85025 COMPLETE CBC W/AUTO DIFF WBC: CPT | Performed by: FAMILY MEDICINE

## 2019-04-19 ASSESSMENT — MIFFLIN-ST. JEOR: SCORE: 1581.3

## 2019-04-19 ASSESSMENT — PAIN SCALES - GENERAL: PAINLEVEL: NO PAIN (0)

## 2019-04-22 RX ORDER — LEVOTHYROXINE SODIUM 75 UG/1
75 TABLET ORAL DAILY
Qty: 90 TABLET | Refills: 3 | Status: SHIPPED | OUTPATIENT
Start: 2019-04-22 | End: 2020-04-03

## 2019-10-02 ENCOUNTER — TRANSFERRED RECORDS (OUTPATIENT)
Dept: HEALTH INFORMATION MANAGEMENT | Facility: CLINIC | Age: 34
End: 2019-10-02

## 2019-10-02 LAB
ALT SERPL-CCNC: 16 IU/L (ref 12–68)
AST SERPL-CCNC: 21 IU/L (ref 15–37)
CHOLEST SERPL-MCNC: 156 MG/DL
CREAT SERPL-MCNC: 0.86 MG/DL (ref 0.6–1)
GFR SERPL CREATININE-BSD FRML MDRD: >60 ML/MIN
GLUCOSE SERPL-MCNC: 93 MG/DL (ref 70–110)
HDLC SERPL-MCNC: 64 MG/DL
LDLC SERPL CALC-MCNC: 83 MG/DL
POTASSIUM SERPL-SCNC: 4.1 MMOL/L (ref 3.5–5.1)
TRIGL SERPL-MCNC: 45 MG/DL

## 2020-03-10 ENCOUNTER — HEALTH MAINTENANCE LETTER (OUTPATIENT)
Age: 35
End: 2020-03-10

## 2020-04-03 ENCOUNTER — MYC REFILL (OUTPATIENT)
Dept: FAMILY MEDICINE | Facility: CLINIC | Age: 35
End: 2020-04-03

## 2020-04-03 DIAGNOSIS — E03.4 HYPOTHYROIDISM DUE TO ACQUIRED ATROPHY OF THYROID: ICD-10-CM

## 2020-04-06 RX ORDER — LEVOTHYROXINE SODIUM 75 UG/1
75 TABLET ORAL DAILY
Qty: 90 TABLET | Refills: 0 | Status: SHIPPED | OUTPATIENT
Start: 2020-04-06 | End: 2020-07-12

## 2020-04-06 NOTE — TELEPHONE ENCOUNTER
Pending Prescriptions:                       Disp   Refills    levothyroxine (SYNTHROID/LEVOTHROID) 75 M*90 tab*0            Sig: Take 1 tablet (75 mcg) by mouth daily    Prescription approved per [facility] Refill Protocol.    Missy Avila, MSN, RN

## 2020-07-02 DIAGNOSIS — E03.4 HYPOTHYROIDISM DUE TO ACQUIRED ATROPHY OF THYROID: ICD-10-CM

## 2020-07-02 LAB — TSH SERPL DL<=0.005 MIU/L-ACNC: 2.57 MU/L (ref 0.4–4)

## 2020-07-02 PROCEDURE — 84443 ASSAY THYROID STIM HORMONE: CPT | Performed by: FAMILY MEDICINE

## 2020-07-02 PROCEDURE — 36415 COLL VENOUS BLD VENIPUNCTURE: CPT | Performed by: FAMILY MEDICINE

## 2020-07-12 ENCOUNTER — MYC REFILL (OUTPATIENT)
Dept: FAMILY MEDICINE | Facility: CLINIC | Age: 35
End: 2020-07-12

## 2020-07-12 DIAGNOSIS — E03.4 HYPOTHYROIDISM DUE TO ACQUIRED ATROPHY OF THYROID: ICD-10-CM

## 2020-07-13 RX ORDER — LEVOTHYROXINE SODIUM 75 UG/1
75 TABLET ORAL DAILY
Qty: 90 TABLET | Refills: 0 | Status: SHIPPED | OUTPATIENT
Start: 2020-07-13 | End: 2020-07-21

## 2020-07-13 NOTE — TELEPHONE ENCOUNTER
Prescription approved per [facility] Refill Protocol.  Danis Tucker, ADELAN, RN, PHN

## 2020-07-16 NOTE — PROGRESS NOTES
"Gretta Muñoz is a 35 year old female who is being evaluated via a billable video visit.      The patient has been notified of following:     \"This video visit will be conducted via a call between you and your physician/provider. We have found that certain health care needs can be provided without the need for an in-person physical exam.  This service lets us provide the care you need with a video conversation.  If a prescription is necessary we can send it directly to your pharmacy.  If lab work is needed we can place an order for that and you can then stop by our lab to have the test done at a later time.    Video visits are billed at different rates depending on your insurance coverage.  Please reach out to your insurance provider with any questions.    If during the course of the call the physician/provider feels a video visit is not appropriate, you will not be charged for this service.\"    Patient has given verbal consent for Video visit? Yes  How would you like to obtain your AVS? MyChart  If you are dropped from the video visit, the video invite should be resent to: Send to e-mail at: [email or web address]  Will anyone else be joining your video visit? No    Subjective     Gretta Muñoz is a 35 year old female who presents today via video visit for the following health issues:    HPI    Hypothyroidism Follow-up      Since last visit, patient describes the following symptoms: Weight stable, no hair loss, no skin changes, no constipation, no loose stools      How many servings of fruits and vegetables do you eat daily?  2-3    On average, how many sweetened beverages do you drink each day (Examples: soda, juice, sweet tea, etc.  Do NOT count diet or artificially sweetened beverages)?   0    How many days per week do you exercise enough to make your heart beat faster? 4    How many minutes a day do you exercise enough to make your heart beat faster? 30 - 60    How many days per week do you miss " "taking your medication? 0    Has been feeling tired. Thinks it is from work.   Has hair loss but not increasing.   The generic was changed on her. Felt \"aiken\" for a time but feels better now.   No other concerns.     She has history of thyroid nodule. No changes that she has noted.   2 years ago had last ultrasound. No changes.        Video Start Time: 10:07 AM        Patient Active Problem List   Diagnosis     Hypothyroidism due to acquired atrophy of thyroid     CARDIOVASCULAR SCREENING; LDL GOAL LESS THAN 160     Thyroid nodule     Past Surgical History:   Procedure Laterality Date     DENTAL SURGERY         Social History     Tobacco Use     Smoking status: Never Smoker     Smokeless tobacco: Never Used   Substance Use Topics     Alcohol use: Yes     Alcohol/week: 0.0 standard drinks     Comment: occasional     Family History   Problem Relation Age of Onset     Diabetes Maternal Grandmother      Diabetes Paternal Grandmother      Hyperlipidemia Mother      Genetic Disorder Mother         prothrombin gene mutation     Thyroid Disease Mother      Genetic Disorder Sister         carrier         BP Readings from Last 3 Encounters:   04/19/19 112/78   04/18/18 110/78   11/27/17 126/79    Wt Readings from Last 3 Encounters:   04/19/19 75.3 kg (166 lb 1.6 oz)   04/18/18 73.3 kg (161 lb 11.2 oz)   11/27/17 74.9 kg (165 lb 1.6 oz)                    Reviewed and updated as needed this visit by Provider  Tobacco  Allergies  Meds  Problems  Med Hx  Surg Hx  Fam Hx         Review of Systems   CONSTITUTIONAL: NEGATIVE for fever, chills, change in weight  INTEGUMENTARY/SKIN: NEGATIVE for worrisome rashes, moles or lesions  ENT/MOUTH: NEGATIVE for ear, mouth and throat problems  RESP: NEGATIVE for significant cough or SOB  CV: NEGATIVE for chest pain, palpitations or peripheral edema  ENDOCRINE: NEGATIVE for temperature intolerance, skin/hair changes  PSYCHIATRIC: NEGATIVE for changes in mood or affect      Objective  "            Physical Exam     GENERAL: Healthy, alert and no distress  EYES: Eyes grossly normal to inspection.  No discharge or erythema, or obvious scleral/conjunctival abnormalities.  RESP: No audible wheeze, cough, or visible cyanosis.  No visible retractions or increased work of breathing.    SKIN: Visible skin clear. No significant rash, abnormal pigmentation or lesions.  NEURO: Cranial nerves grossly intact.  Mentation and speech appropriate for age.  PSYCH: Mentation appears normal, affect normal/bright, judgement and insight intact, normal speech and appearance well-groomed.      Diagnostic Test Results:  TSH   Date Value Ref Range Status   07/02/2020 2.57 0.40 - 4.00 mU/L Final             Assessment & Plan     1. Hypothyroidism due to acquired atrophy of thyroid  Doing well. Well controlled. Tolerating medication.  No change in plan.      2. Thyroid nodule  Will recheck thyroid ultrasound as it has been two years.   Will notify of results.          Has physical through [facility]. Planning to go in the fall.     Return in about 1 year (around 7/21/2021) for Recheck.    Lavinia Roque MD  [facility]      Video-Visit Details    Type of service:  Video Visit    Video End Time:10:16 AM    Originating Location (pt. Location): Other work    Distant Location (provider location):  [facility]     Platform used for Video Visit: Gino    Return in about 1 year (around 7/21/2021) for Recheck.       Lavinia Roque MD

## 2020-07-21 ENCOUNTER — VIRTUAL VISIT (OUTPATIENT)
Dept: FAMILY MEDICINE | Facility: OTHER | Age: 35
End: 2020-07-21
Payer: COMMERCIAL

## 2020-07-21 DIAGNOSIS — E04.1 THYROID NODULE: Primary | ICD-10-CM

## 2020-07-21 DIAGNOSIS — E03.4 HYPOTHYROIDISM DUE TO ACQUIRED ATROPHY OF THYROID: ICD-10-CM

## 2020-07-21 PROCEDURE — 99213 OFFICE O/P EST LOW 20 MIN: CPT | Mod: 95 | Performed by: FAMILY MEDICINE

## 2020-07-21 RX ORDER — LEVOTHYROXINE SODIUM 75 UG/1
75 TABLET ORAL DAILY
Qty: 90 TABLET | Refills: 3 | Status: SHIPPED | OUTPATIENT
Start: 2020-07-21 | End: 2021-09-28

## 2020-07-21 NOTE — PATIENT INSTRUCTIONS
Kodi Glynn,    It was so nice to visit with you!    I have sent a year of your thyroid medication to your pharmacy.     I also placed the order for your thyroid ultrasound. You can schedule at [place] at [phone].     Take care!    Lavinia Roque MD

## 2020-08-12 ENCOUNTER — ANCILLARY PROCEDURE (OUTPATIENT)
Dept: ULTRASOUND IMAGING | Facility: CLINIC | Age: 35
End: 2020-08-12
Attending: FAMILY MEDICINE
Payer: COMMERCIAL

## 2020-08-12 DIAGNOSIS — E04.1 THYROID NODULE: ICD-10-CM

## 2020-08-12 PROCEDURE — 76536 US EXAM OF HEAD AND NECK: CPT | Performed by: RADIOLOGY

## 2020-09-28 ENCOUNTER — TRANSFERRED RECORDS (OUTPATIENT)
Dept: HEALTH INFORMATION MANAGEMENT | Facility: CLINIC | Age: 35
End: 2020-09-28

## 2020-09-29 ENCOUNTER — OFFICE VISIT (OUTPATIENT)
Dept: FAMILY MEDICINE | Facility: CLINIC | Age: 35
End: 2020-09-29
Payer: COMMERCIAL

## 2020-09-29 VITALS
BODY MASS INDEX: 23.01 KG/M2 | DIASTOLIC BLOOD PRESSURE: 86 MMHG | TEMPERATURE: 98.2 F | WEIGHT: 174.4 LBS | SYSTOLIC BLOOD PRESSURE: 126 MMHG | RESPIRATION RATE: 16 BRPM | OXYGEN SATURATION: 100 % | HEART RATE: 75 BPM

## 2020-09-29 DIAGNOSIS — K21.9 GASTROESOPHAGEAL REFLUX DISEASE, ESOPHAGITIS PRESENCE NOT SPECIFIED: Primary | ICD-10-CM

## 2020-09-29 PROCEDURE — 99213 OFFICE O/P EST LOW 20 MIN: CPT | Performed by: NURSE PRACTITIONER

## 2020-09-29 RX ORDER — MULTIPLE VITAMINS W/ MINERALS TAB 9MG-400MCG
TAB ORAL
COMMUNITY
Start: 2020-06-01 | End: 2021-09-13

## 2020-09-29 ASSESSMENT — PAIN SCALES - GENERAL: PAINLEVEL: NO PAIN (0)

## 2020-09-29 NOTE — PROGRESS NOTES
Subjective     Gretta Muñoz is a 35 year old female who presents to clinic today for the following health issues:    HPI       GERD/Heartburn  Onset/Duration: 2 weeks  Description: Patient has been having reflux/heartburn after every meal for 2 weeks. OTC meds have not been helping symptoms. Will start out as nausea and work her way up esophagus and turn into a reflux feeling  Intensity: mild  Progression of Symptoms: same and intermittent  Accompanying Signs & Symptoms:  Does it feel like food gets stuck or trouble swallowing: no  Nausea: YES  Vomiting (bloody?): no  Abdominal Pain: no  Black-Tarry stools: no  Bloody stools: no  History:  Previous similar episodes: no  Previous ulcers: no  Precipitating factors:   Caffeine use: YES- drinks coffee in the morning  Alcohol use: YES- couple drinks a week  NSAID/Aspirin use: YES- occ  Tobacco use: no  Worse with no particular food or drink.  Alleviating factors: None  Therapies tried and outcome:             Lifestyle changes: Tried cutting back in coffee and other foods but didn't notice a difference            Medications: Omeprazole (Prilosec), antacids and medication not helpful  Took Prilosec for a couple day, tums for a few doses.   Patient feels her nutrition is good.  She is an avid runner.  No recent weight loss.  No abdominal pain , normal bowel pattern.    Review of Systems   Constitutional, HEENT, cardiovascular, pulmonary, gi and gu systems are negative, except as otherwise noted.      Objective    /86   Pulse 75   Temp 98.2  F (36.8  C) (Temporal)   Resp 16   Wt 79.1 kg (174 lb 6.4 oz)   SpO2 100%   BMI 23.01 kg/m    Body mass index is 23.01 kg/m .  Physical Exam   GENERAL: healthy, alert and no distress  EYES: Eyes grossly normal to inspection, PERRL and conjunctivae and sclerae normal  HENT: ear canals and TM's normal, nose and mouth without ulcers or lesions  NECK: no adenopathy, no asymmetry, masses, or scars and thyroid normal to  palpation  RESP: lungs clear to auscultation - no rales, rhonchi or wheezes  CV: regular rate and rhythm, normal S1 S2, no S3 or S4, no murmur, click or rub, no peripheral edema and peripheral pulses strong  ABDOMEN: soft, nontender, no hepatosplenomegaly, no masses and bowel sounds normal  NEURO: Normal strength and tone, mentation intact and speech normal    Orders Only on 07/02/2020   Component Date Value Ref Range Status     TSH 07/02/2020 2.57  0.40 - 4.00 mU/L Final           Assessment & Plan     Gretta was seen today for gastrophageal reflux.    Diagnoses and all orders for this visit:    Gastroesophageal reflux disease, esophagitis presence not specified  -     omeprazole (PRILOSEC) 20 MG DR capsule; Take 1 capsule (20 mg) by mouth daily           .  GERD cares, discussed small frequent meals, acidic foods to avoid, spacing between dinner and bedtime meal.  Discussed use of Prilosec for the next 8 weeks.  Discussed patient likely did not give the PPI enough time.  Follow-up for further testing for H. pylori and/or endoscopy if not improving.  Exam is reassuring.  continue exercise.  Has Nexplanon scheduled in 2 months.  PE was completed getting KT.    Return in about 2 months (around 11/29/2020) for re-check office visit.    MICHELET Gandara [facility]

## 2020-11-04 ENCOUNTER — TRANSFERRED RECORDS (OUTPATIENT)
Dept: MULTI SPECIALTY CLINIC | Facility: CLINIC | Age: 35
End: 2020-11-04

## 2020-11-04 LAB
HPV ABSTRACT: NORMAL
PAP-ABSTRACT: NORMAL

## 2020-11-05 NOTE — PROGRESS NOTES
Subjective     Gretta Muñoz is a 35 year old female who presents to clinic today for the following health issues:      Removal and re-insertion of new Nexplanon.   Informed consetn reviewed.   Device not yet expiring - due 11/27/2020 for change out.   History of Present Illness       She eats 2-3 servings of fruits and vegetables daily.She consumes 0 sweetened beverage(s) daily.She exercises with enough effort to increase her heart rate 30 to 60 minutes per day.  She exercises with enough effort to increase her heart rate 5 days per week.   She is taking medications regularly.                 Review of Systems   Constitutional, HEENT, cardiovascular, pulmonary, gi and gu systems are negative, except as otherwise noted.      Objective    /76   Pulse 66   Temp 99  F (37.2  C) (Temporal)   Resp 16   Wt 78.7 kg (173 lb 8 oz)   LMP 10/16/2020 (Exact Date)   SpO2 99%   BMI 22.89 kg/m    Body mass index is 22.89 kg/m .  Physical Exam   GENERAL: healthy, alert and no distress  MS: no gross musculoskeletal defects noted, no edema  SKIN: no suspicious lesions or rashes  NEURO: Normal strength and tone, mentation intact and speech normal    No results found for any visits on 11/10/20.        Assessment & Plan     Gretta was seen today for contraception.    Diagnoses and all orders for this visit:    Encounter for removal and reinsertion of Nexplanon  -     etonogestrel (NEXPLANON) subdermal implant 68 mg  -     Discontinue: etonogestrel (NEXPLANON) 68 MG IMPL; 1 each (68 mg) by Subdermal route once for 1 dose  -     etonogestrel (NEXPLANON) 68 MG IMPL; 1 each (68 mg) by Subdermal route once for 1 dose - Insertion 11/10/2020            Nexplanon Removal and re-insertion Procedure note: After informed consent:Region cleansed and anesthestized with buffered 1% lidocaine with epinephrine.  Using an 11 Blade Scalpel a 3 mm incision at distal tip of Nexplanon device on left upper arm. Nexplanon device retracted  intact with forceps, without incident.    Then in same space, new Nexplanon inserted, and palpated by pt. And provider.   Pt. Tolerated well.    Hemostasis done with steri-strips, and appropriate dressing applied.  Back-up contraception X 1 week. Wound cares and bruising discussed.     Answered questions about Gerd, switching to H2 blocker.   F/u if not better.     Return in about 4 weeks (around 12/8/2020) for Physical Exam.    MICHELET Gandara [facility] [place]

## 2020-11-10 ENCOUNTER — OFFICE VISIT (OUTPATIENT)
Dept: FAMILY MEDICINE | Facility: CLINIC | Age: 35
End: 2020-11-10
Payer: COMMERCIAL

## 2020-11-10 VITALS
DIASTOLIC BLOOD PRESSURE: 76 MMHG | TEMPERATURE: 99 F | BODY MASS INDEX: 22.89 KG/M2 | RESPIRATION RATE: 16 BRPM | HEART RATE: 66 BPM | SYSTOLIC BLOOD PRESSURE: 124 MMHG | OXYGEN SATURATION: 99 % | WEIGHT: 173.5 LBS

## 2020-11-10 DIAGNOSIS — Z30.46 ENCOUNTER FOR REMOVAL AND REINSERTION OF NEXPLANON: Primary | ICD-10-CM

## 2020-11-10 PROCEDURE — 99207 PR DROP WITH A PROCEDURE: CPT | Performed by: NURSE PRACTITIONER

## 2020-11-10 PROCEDURE — 11983 REMOVE/INSERT DRUG IMPLANT: CPT | Performed by: NURSE PRACTITIONER

## 2020-11-24 ENCOUNTER — MYC MEDICAL ADVICE (OUTPATIENT)
Dept: FAMILY MEDICINE | Facility: CLINIC | Age: 35
End: 2020-11-24

## 2020-11-24 DIAGNOSIS — B37.31 YEAST VAGINITIS: Primary | ICD-10-CM

## 2020-11-24 RX ORDER — FLUCONAZOLE 150 MG/1
150 TABLET ORAL ONCE
Qty: 1 TABLET | Refills: 0 | Status: SHIPPED | OUTPATIENT
Start: 2020-11-24 | End: 2020-11-24

## 2020-12-10 DIAGNOSIS — R53.83 FATIGUE, UNSPECIFIED TYPE: ICD-10-CM

## 2020-12-10 LAB — TSH SERPL DL<=0.005 MIU/L-ACNC: 2.78 MU/L (ref 0.4–4)

## 2020-12-10 PROCEDURE — 84443 ASSAY THYROID STIM HORMONE: CPT | Performed by: FAMILY MEDICINE

## 2020-12-10 PROCEDURE — 36415 COLL VENOUS BLD VENIPUNCTURE: CPT | Performed by: FAMILY MEDICINE

## 2021-01-05 NOTE — PROGRESS NOTES
{PROVIDER CHARTING PREFERENCE:989764}    Subjective     Gretta Muñoz is a 35 year old female who presents to clinic today for the following health issues {ACCOMPANIED BY STATEMENT (Optional):777319}    HPI       {SUPERLIST (Optional):343909}  {additonal problems for provider to add (Optional):315782}    Review of Systems   {ROS COMP (Optional):094471}      Objective    There were no vitals taken for this visit.  There is no height or weight on file to calculate BMI.  Physical Exam   {Exam List (Optional):516991}    {Diagnostic Test Results (Optional):400405}    {AMBULATORY ATTESTATION (Optional):515299}

## 2021-01-07 ENCOUNTER — VIRTUAL VISIT (OUTPATIENT)
Dept: FAMILY MEDICINE | Facility: CLINIC | Age: 36
End: 2021-01-07
Payer: COMMERCIAL

## 2021-01-07 DIAGNOSIS — Z11.4 SCREENING FOR HIV (HUMAN IMMUNODEFICIENCY VIRUS): ICD-10-CM

## 2021-01-07 DIAGNOSIS — R11.0 NAUSEA: Primary | ICD-10-CM

## 2021-01-07 DIAGNOSIS — Z11.59 NEED FOR HEPATITIS C SCREENING TEST: ICD-10-CM

## 2021-01-07 PROCEDURE — 99213 OFFICE O/P EST LOW 20 MIN: CPT | Mod: 95 | Performed by: NURSE PRACTITIONER

## 2021-01-07 NOTE — PROGRESS NOTES
Gretta is a 35 year old who is being evaluated via a billable telephone visit.   What phone number would you like to be contacted at? [phone]  How would you like to obtain your AVS? [facility]  Assessment & Plan   Problem List Items Addressed This Visit     None      Visit Diagnoses     Nausea    -  Primary    Relevant Orders    GASTROENTEROLOGY ADULT REF CONSULT ONLY    Screening for HIV (human immunodeficiency virus)        Relevant Orders    Hepatitis C antibody    Need for hepatitis C screening test        Relevant Orders    HIV Antigen Antibody Combo                                      CONSULTATION/REFERRAL to GI- as unclear etiology of symptoms. Lower concern for reflux or gall bladder pathology as not occurring with PM meal. No pain.   Occurred after being on Nexplanon for over 2 1/5 years, so not thinking hormone/medication induced. No changes with PPI.     Return in about 2 weeks (around 1/21/2021) for Specialty follow-up/Referral.    MICHELET Gandara [facility] [place]    Subjective  Answers for HPI/ROS submitted by the patient on 1/7/2021   Chronic problems general questions HPI Form  How many servings of fruits and vegetables do you eat daily?: 2-3  On average, how many sweetened beverages do you drink each day (Examples: soda, juice, sweet tea, etc.  Do NOT count diet or artificially sweetened beverages)?: 0  How many minutes a day do you exercise enough to make your heart beat faster?: 30 to 60  How many days a week do you exercise enough to make your heart beat faster?: 5  How many days per week do you miss taking your medication?: 0      Gretta is a 35 year old who presents to clinic today for the following health issues nausea in am and after lunch. No abdominal pain.   Eats fairly healthy. BMI is not obese.   No change with symptoms on PPI.   Denies abdominal pain.   BM's regular.           History of Present Illness       She eats 2-3 servings of fruits and vegetables  daily.She consumes 0 sweetened beverage(s) daily.She exercises with enough effort to increase her heart rate 30 to 60 minutes per day.  She exercises with enough effort to increase her heart rate 5 days per week.   She is taking medications regularly.         Ongoing Nausea  for 3 months.   No vomiting. No coughing. No rashes. No recent travel.   No food intolerances.   Right rib pain on and off for 3 months. No radiation.   Nexplanon was placed November 2020.   Therapies tried was Pepsid and Omeprazole. Medications  did not give patient relief.             Review of Systems   Constitutional, HEENT, cardiovascular, pulmonary, gi and gu systems are negative, except as otherwise noted.      Objective           Vitals:  No vitals were obtained today due to virtual visit.    Physical Exam   healthy, alert and no distress  PSYCH: Alert and oriented times 3; coherent speech, normal   rate and volume, able to articulate logical thoughts, able   to abstract reason, no tangential thoughts, no hallucinations   or delusions  Her affect is normal and pleasant  RESP: No cough, no audible wheezing, able to talk in full sentences  Remainder of exam unable to be completed due to telephone visits                Phone call duration: ~ 8 minutes

## 2021-02-09 ENCOUNTER — VIRTUAL VISIT (OUTPATIENT)
Dept: GASTROENTEROLOGY | Facility: CLINIC | Age: 36
End: 2021-02-09
Payer: COMMERCIAL

## 2021-02-09 DIAGNOSIS — K21.9 GASTROESOPHAGEAL REFLUX DISEASE, UNSPECIFIED WHETHER ESOPHAGITIS PRESENT: ICD-10-CM

## 2021-02-09 DIAGNOSIS — R11.0 NAUSEA: Primary | ICD-10-CM

## 2021-02-09 PROCEDURE — 99213 OFFICE O/P EST LOW 20 MIN: CPT | Mod: 95 | Performed by: INTERNAL MEDICINE

## 2021-02-09 NOTE — PROGRESS NOTES
Gretat is a 35 year old who is being evaluated via a billable telephone visit.      What phone number would you like to be contacted at?[phone]   How would you like to obtain your AVS? My chart  Phone call duration:  minutes  David Gant CMA

## 2021-02-10 NOTE — PATIENT INSTRUCTIONS
It was nice to talk to you today.  As we discussed, you should discontinue your iron supplement.  I will place orders to check blood counts and iron studies in a few months time.  You can also get a stool H. pylori done then.  If you do not improve with being off of the iron supplement, we can pursue an upper endoscopy.  You could also consider meeting with health psychology in order to see if stress management could improve the symptoms.  If symptoms worsen, let me know and we can discuss short course of Zofran for symptomatic management.    Hugh Eli MD

## 2021-02-10 NOTE — PROGRESS NOTES
"      GASTROENTEROLOGY Telephone NPV    CC/REFERRING MD:    Lavinia Roque    HISTORY OF PRESENT ILLNESS:    Gretta Muñoz is a 35 year old female who is being evaluated via a billable telephone visit.      We did a telephone visit today for nausea and reflux.  She reports that in October she developed daily reflux symptoms for approximately 2 weeks.  This was associated with eating certain foods such as acidic foods.  She did see her primary provider for this and was given omeprazole and this improved the symptoms of reflux.  She notes that around that time, she was also having some rib pain which she felt was muscular and was using ibuprofen.  The rib pain was worse with moving and worse with running.  After she took the omeprazole for the reflux, the reflux did improve but she was left with nausea.  She reports that she gets a \"pit\" in her stomach after eating breakfast and after eating lunch.  She also feels nauseous but does not vomit.  This only occurs during the week and it does not occur on the weekends.  There is no dysphagia.  There is no weight loss.  She reports that her health is otherwise good.  She does take an iron supplement chronically which she has done for many years but does not know if she was specifically anemic in the past.  There are no pertinent surgeries.  There is no pertinent family history.    I have reviewed and updated the patient's Past Medical History, Social History, Family History and Medication List.    ALLERGIES  Patient has no known allergies.    PERTINENT STUDIES have been reviewed.    ASSESSMENT/PLAN:    Gretta Muñoz is a 35 year old female who presents for evaluation of reflux and chronic nausea.  The reflux symptoms have improved with PPI therapy.  She is really only using the PPI intermittently at this point when periods of reflux act up and this seems fairly reasonable.  The reflux may have been triggered by the ibuprofen use but she is not using ibuprofen now.  " We discussed that chronic nausea can have many possible reasons.  Drug effects are very common reason and iron supplements may do this.  I have suggested that she discontinue her iron supplements and we can recheck blood counts and iron studies in a few months time.  I will also order a stool H. pylori test.  He does not have enough iron, then we can pursue an upper endoscopy.  I did offer that we could refer her to health psychology to further examine if there is any link between stress, work and the symptoms but she would prefer to hold off at this time.  We will follow-up in a few months time.    Phone call duration: 23 minutes    RTC 3 months    Thank you for this consultation.  It was a pleasure to participate in the care of this patient; please contact us with any further questions.      This note was created with voice recognition software, and while reviewed for accuracy, typos may remain.     Hugh Eli MD  Division of Gastroenterology, Hepatology and Nutrition  [facility]  [phone]

## 2021-03-02 PROCEDURE — 87338 HPYLORI STOOL AG IA: CPT | Performed by: INTERNAL MEDICINE

## 2021-03-03 DIAGNOSIS — R11.0 NAUSEA: ICD-10-CM

## 2021-03-03 DIAGNOSIS — K21.9 GASTROESOPHAGEAL REFLUX DISEASE, UNSPECIFIED WHETHER ESOPHAGITIS PRESENT: ICD-10-CM

## 2021-03-03 LAB
ALBUMIN SERPL-MCNC: 4.3 G/DL (ref 3.4–5)
ALP SERPL-CCNC: 47 U/L (ref 40–150)
ALT SERPL W P-5'-P-CCNC: 25 U/L (ref 0–50)
ANION GAP SERPL CALCULATED.3IONS-SCNC: 1 MMOL/L (ref 3–14)
AST SERPL W P-5'-P-CCNC: 17 U/L (ref 0–45)
BASOPHILS # BLD AUTO: 0 10E9/L (ref 0–0.2)
BASOPHILS NFR BLD AUTO: 0.5 %
BILIRUB SERPL-MCNC: 0.5 MG/DL (ref 0.2–1.3)
BUN SERPL-MCNC: 15 MG/DL (ref 7–30)
CALCIUM SERPL-MCNC: 9.1 MG/DL (ref 8.5–10.1)
CHLORIDE SERPL-SCNC: 109 MMOL/L (ref 94–109)
CO2 SERPL-SCNC: 30 MMOL/L (ref 20–32)
CREAT SERPL-MCNC: 0.9 MG/DL (ref 0.52–1.04)
DIFFERENTIAL METHOD BLD: NORMAL
EOSINOPHIL # BLD AUTO: 0.1 10E9/L (ref 0–0.7)
EOSINOPHIL NFR BLD AUTO: 1.3 %
ERYTHROCYTE [DISTWIDTH] IN BLOOD BY AUTOMATED COUNT: 13.1 % (ref 10–15)
FERRITIN SERPL-MCNC: 31 NG/ML (ref 12–150)
GFR SERPL CREATININE-BSD FRML MDRD: 82 ML/MIN/{1.73_M2}
GLUCOSE SERPL-MCNC: 96 MG/DL (ref 70–99)
HCT VFR BLD AUTO: 41 % (ref 35–47)
HGB BLD-MCNC: 13.1 G/DL (ref 11.7–15.7)
IMM GRANULOCYTES # BLD: 0 10E9/L (ref 0–0.4)
IMM GRANULOCYTES NFR BLD: 0.3 %
IRON SERPL-MCNC: 158 UG/DL (ref 35–180)
LYMPHOCYTES # BLD AUTO: 1.8 10E9/L (ref 0.8–5.3)
LYMPHOCYTES NFR BLD AUTO: 29.1 %
MCH RBC QN AUTO: 29.1 PG (ref 26.5–33)
MCHC RBC AUTO-ENTMCNC: 32 G/DL (ref 31.5–36.5)
MCV RBC AUTO: 91 FL (ref 78–100)
MONOCYTES # BLD AUTO: 0.4 10E9/L (ref 0–1.3)
MONOCYTES NFR BLD AUTO: 7 %
NEUTROPHILS # BLD AUTO: 3.8 10E9/L (ref 1.6–8.3)
NEUTROPHILS NFR BLD AUTO: 61.8 %
PLATELET # BLD AUTO: 197 10E9/L (ref 150–450)
POTASSIUM SERPL-SCNC: 3.8 MMOL/L (ref 3.4–5.3)
PROT SERPL-MCNC: 8.1 G/DL (ref 6.8–8.8)
RBC # BLD AUTO: 4.5 10E12/L (ref 3.8–5.2)
SODIUM SERPL-SCNC: 140 MMOL/L (ref 133–144)
TIBC SERPL-MCNC: 328 UG/DL (ref 240–430)
TRANSFERRIN SERPL-MCNC: 259 MG/DL (ref 210–360)
WBC # BLD AUTO: 6.2 10E9/L (ref 4–11)

## 2021-03-03 PROCEDURE — 82728 ASSAY OF FERRITIN: CPT | Performed by: INTERNAL MEDICINE

## 2021-03-03 PROCEDURE — 83516 IMMUNOASSAY NONANTIBODY: CPT | Mod: 59 | Performed by: INTERNAL MEDICINE

## 2021-03-03 PROCEDURE — 85025 COMPLETE CBC W/AUTO DIFF WBC: CPT | Performed by: INTERNAL MEDICINE

## 2021-03-03 PROCEDURE — 36415 COLL VENOUS BLD VENIPUNCTURE: CPT | Performed by: INTERNAL MEDICINE

## 2021-03-03 PROCEDURE — 82784 ASSAY IGA/IGD/IGG/IGM EACH: CPT | Performed by: INTERNAL MEDICINE

## 2021-03-03 PROCEDURE — 83550 IRON BINDING TEST: CPT | Performed by: INTERNAL MEDICINE

## 2021-03-03 PROCEDURE — 80053 COMPREHEN METABOLIC PANEL: CPT | Performed by: INTERNAL MEDICINE

## 2021-03-03 PROCEDURE — 84466 ASSAY OF TRANSFERRIN: CPT | Performed by: INTERNAL MEDICINE

## 2021-03-03 PROCEDURE — 83516 IMMUNOASSAY NONANTIBODY: CPT | Performed by: INTERNAL MEDICINE

## 2021-03-03 PROCEDURE — 83540 ASSAY OF IRON: CPT | Performed by: INTERNAL MEDICINE

## 2021-03-04 LAB
H PYLORI AG STL QL IA: NEGATIVE
IGA SERPL-MCNC: 166 MG/DL (ref 84–499)
TTG IGA SER-ACNC: <1 U/ML
TTG IGG SER-ACNC: <1 U/ML

## 2021-05-11 ENCOUNTER — VIRTUAL VISIT (OUTPATIENT)
Dept: GASTROENTEROLOGY | Facility: CLINIC | Age: 36
End: 2021-05-11
Payer: COMMERCIAL

## 2021-05-11 DIAGNOSIS — K21.9 GASTROESOPHAGEAL REFLUX DISEASE, UNSPECIFIED WHETHER ESOPHAGITIS PRESENT: Primary | ICD-10-CM

## 2021-05-11 PROCEDURE — 99213 OFFICE O/P EST LOW 20 MIN: CPT | Mod: 95 | Performed by: INTERNAL MEDICINE

## 2021-05-11 NOTE — PATIENT INSTRUCTIONS
Gretta,  It was nice to talk with you today.  As we discussed, we will schedule you for an upper endoscopy at [place] with myself for further evaluation of your symptoms.  You can continue your omeprazole on a daily basis until that time.  Please let me know if you have any questions or other concerns.  Hugh Eli MD

## 2021-05-11 NOTE — PROGRESS NOTES
Gretta is a 36 year old who is being evaluated via a billable telephone visit.      What phone number would you like to be contacted at? [phone]  How would you like to obtain your AVS? MyChart  Phone call duration:  Minutes  David Gant CMA

## 2021-05-12 NOTE — PROGRESS NOTES
GASTROENTEROLOGY Telephone Follow up visit    CC/REFERRING MD:    Lavinia Roque    HISTORY OF PRESENT ILLNESS:    Gretta Muñoz is a 36 year old female who is being evaluated via a billable telephone visit.      We did a follow-up today for reflux and nausea.  We previously discussed this issue with her in February 2021.  She had been using PPI therapy at that time with some improvement.  We did a stool H. pylori test and this was negative.  She notes that she continues to use PPI therapy.  Despite this, she continues to get symptoms 3 to 4 days/week and she has an incomplete relief of symptoms.  She gets tightness in the chest and throat and then she will take Tums.  She does not have any dysphagia.  Her weight is stable.  She does not have any other concerns at this time.    I have reviewed and updated the patient's Past Medical History, Social History, Family History and Medication List.    ALLERGIES  Patient has no known allergies.    PERTINENT STUDIES have been reviewed.    ASSESSMENT/PLAN:    Gretta Muñoz is a 36 year old female who presents for follow up of chronic reflux and nausea.  Symptoms have partial but incomplete relief with PPI therapy on a daily basis.  There is no dysphagia or other alarm signs.  We discussed the addition of H2 blocker at night to help with the nocturnal symptoms versus proceeding with upper endoscopy in order to further evaluate chronic reflux without appropriate response to PPI.  She would like to proceed with an upper endoscopy at this time which is reasonable.  We will schedule the procedure with myself at [place] in the near future.  And follow back up with her with another visit to ensure that her symptoms are adequately addressed.      Phone call duration: 12 minutes    RTC 4 months    Thank you for this consultation.  It was a pleasure to participate in the care of this patient; please contact us with any further questions.      This note was created  with voice recognition software, and while reviewed for accuracy, typos may remain.     Hugh Eli MD  Division of Gastroenterology, Hepatology and Nutrition  [facility]  [phone]

## 2021-05-24 ENCOUNTER — TELEPHONE (OUTPATIENT)
Dept: FAMILY MEDICINE | Facility: CLINIC | Age: 36
End: 2021-05-24

## 2021-05-24 NOTE — TELEPHONE ENCOUNTER
Patient Quality Outreach Summary      Summary:    Patient is due/failing the following:   Cervical Cancer Screening - PAP Needed    Type of outreach:    Abstracted    Questions for provider review:    None                                                                                                                    Sudha Baptiste CMA       Chart routed to Care Team.

## 2021-06-04 DIAGNOSIS — Z11.59 ENCOUNTER FOR SCREENING FOR OTHER VIRAL DISEASES: ICD-10-CM

## 2021-07-19 ENCOUNTER — LAB (OUTPATIENT)
Dept: LAB | Facility: CLINIC | Age: 36
End: 2021-07-19
Payer: COMMERCIAL

## 2021-07-19 DIAGNOSIS — Z11.59 ENCOUNTER FOR SCREENING FOR OTHER VIRAL DISEASES: ICD-10-CM

## 2021-07-19 PROCEDURE — U0005 INFEC AGEN DETEC AMPLI PROBE: HCPCS

## 2021-07-19 PROCEDURE — U0003 INFECTIOUS AGENT DETECTION BY NUCLEIC ACID (DNA OR RNA); SEVERE ACUTE RESPIRATORY SYNDROME CORONAVIRUS 2 (SARS-COV-2) (CORONAVIRUS DISEASE [COVID-19]), AMPLIFIED PROBE TECHNIQUE, MAKING USE OF HIGH THROUGHPUT TECHNOLOGIES AS DESCRIBED BY CMS-2020-01-R: HCPCS

## 2021-07-20 LAB — SARS-COV-2 RNA RESP QL NAA+PROBE: NEGATIVE

## 2021-07-22 ENCOUNTER — HOSPITAL ENCOUNTER (OUTPATIENT)
Facility: AMBULATORY SURGERY CENTER | Age: 36
Discharge: HOME | End: 2021-07-22
Attending: INTERNAL MEDICINE | Admitting: INTERNAL MEDICINE
Payer: COMMERCIAL

## 2021-07-22 VITALS
TEMPERATURE: 97.4 F | DIASTOLIC BLOOD PRESSURE: 67 MMHG | HEART RATE: 79 BPM | OXYGEN SATURATION: 98 % | SYSTOLIC BLOOD PRESSURE: 112 MMHG | RESPIRATION RATE: 16 BRPM

## 2021-07-22 LAB — UPPER GI ENDOSCOPY: NORMAL

## 2021-07-22 PROCEDURE — G8907 PT DOC NO EVENTS ON DISCHARG: HCPCS

## 2021-07-22 PROCEDURE — 88305 TISSUE EXAM BY PATHOLOGIST: CPT | Performed by: PATHOLOGY

## 2021-07-22 PROCEDURE — 88342 IMHCHEM/IMCYTCHM 1ST ANTB: CPT | Performed by: PATHOLOGY

## 2021-07-22 PROCEDURE — 43239 EGD BIOPSY SINGLE/MULTIPLE: CPT

## 2021-07-22 PROCEDURE — G8918 PT W/O PREOP ORDER IV AB PRO: HCPCS

## 2021-07-22 RX ORDER — FENTANYL CITRATE 50 UG/ML
INJECTION, SOLUTION INTRAMUSCULAR; INTRAVENOUS PRN
Status: DISCONTINUED | OUTPATIENT
Start: 2021-07-22 | End: 2021-07-22 | Stop reason: HOSPADM

## 2021-07-22 RX ORDER — NALOXONE HYDROCHLORIDE 0.4 MG/ML
0.2 INJECTION, SOLUTION INTRAMUSCULAR; INTRAVENOUS; SUBCUTANEOUS
Status: DISCONTINUED | OUTPATIENT
Start: 2021-07-22 | End: 2021-07-23 | Stop reason: HOSPADM

## 2021-07-22 RX ORDER — ONDANSETRON 2 MG/ML
4 INJECTION INTRAMUSCULAR; INTRAVENOUS EVERY 6 HOURS PRN
Status: DISCONTINUED | OUTPATIENT
Start: 2021-07-22 | End: 2021-07-23 | Stop reason: HOSPADM

## 2021-07-22 RX ORDER — NALOXONE HYDROCHLORIDE 0.4 MG/ML
0.4 INJECTION, SOLUTION INTRAMUSCULAR; INTRAVENOUS; SUBCUTANEOUS
Status: DISCONTINUED | OUTPATIENT
Start: 2021-07-22 | End: 2021-07-23 | Stop reason: HOSPADM

## 2021-07-22 RX ORDER — ONDANSETRON 4 MG/1
4 TABLET, ORALLY DISINTEGRATING ORAL EVERY 6 HOURS PRN
Status: DISCONTINUED | OUTPATIENT
Start: 2021-07-22 | End: 2021-07-23 | Stop reason: HOSPADM

## 2021-07-22 RX ORDER — PROCHLORPERAZINE MALEATE 10 MG
10 TABLET ORAL EVERY 6 HOURS PRN
Status: DISCONTINUED | OUTPATIENT
Start: 2021-07-22 | End: 2021-07-23 | Stop reason: HOSPADM

## 2021-07-22 RX ORDER — LIDOCAINE 40 MG/G
CREAM TOPICAL
Status: DISCONTINUED | OUTPATIENT
Start: 2021-07-22 | End: 2021-07-23 | Stop reason: HOSPADM

## 2021-07-22 RX ORDER — ONDANSETRON 2 MG/ML
4 INJECTION INTRAMUSCULAR; INTRAVENOUS
Status: DISCONTINUED | OUTPATIENT
Start: 2021-07-22 | End: 2021-07-23 | Stop reason: HOSPADM

## 2021-07-22 RX ORDER — FLUMAZENIL 0.1 MG/ML
0.2 INJECTION, SOLUTION INTRAVENOUS
Status: ACTIVE | OUTPATIENT
Start: 2021-07-22 | End: 2021-07-22

## 2021-07-27 LAB
PATH REPORT.COMMENTS IMP SPEC: NORMAL
PATH REPORT.COMMENTS IMP SPEC: NORMAL
PATH REPORT.FINAL DX SPEC: NORMAL
PATH REPORT.GROSS SPEC: NORMAL
PATH REPORT.MICROSCOPIC SPEC OTHER STN: NORMAL
PATH REPORT.RELEVANT HX SPEC: NORMAL
PHOTO IMAGE: NORMAL

## 2021-09-12 ENCOUNTER — MYC MEDICAL ADVICE (OUTPATIENT)
Dept: FAMILY MEDICINE | Facility: CLINIC | Age: 36
End: 2021-09-12

## 2021-09-12 DIAGNOSIS — E03.4 HYPOTHYROIDISM DUE TO ACQUIRED ATROPHY OF THYROID: Primary | ICD-10-CM

## 2021-09-13 NOTE — TELEPHONE ENCOUNTER
Provider please review, advise and order labs if needed prior to appt.    Sarah Alfonso CMA ([facility])

## 2021-09-14 ENCOUNTER — LAB (OUTPATIENT)
Dept: LAB | Facility: CLINIC | Age: 36
End: 2021-09-14
Payer: COMMERCIAL

## 2021-09-14 DIAGNOSIS — E03.4 HYPOTHYROIDISM DUE TO ACQUIRED ATROPHY OF THYROID: ICD-10-CM

## 2021-09-14 LAB — TSH SERPL DL<=0.005 MIU/L-ACNC: 2.28 MU/L (ref 0.4–4)

## 2021-09-14 PROCEDURE — 36415 COLL VENOUS BLD VENIPUNCTURE: CPT

## 2021-09-14 PROCEDURE — 84443 ASSAY THYROID STIM HORMONE: CPT

## 2021-09-27 NOTE — PROGRESS NOTES
"Gretta is a 36 year old who is being evaluated via a billable telephone visit.      What phone number would you like to be contacted at? [phone]  How would you like to obtain your AVS? MyChart    Assessment & Plan     Hypothyroidism due to acquired atrophy of thyroid  Patient is doing well.  She denies any problems or concerns.  She is tolerating her medication well without problem.  Reviewed last TSH which is in normal range.  Continue with levothyroxine 75 mcg 1 tablet daily.  Recheck in 1 year.  - levothyroxine (SYNTHROID/LEVOTHROID) 75 MCG tablet; Take 1 tablet (75 mcg) by mouth daily    Impaired fasting glucose  Patient with a reported results of a glucose of 100 while fasting.  This was for a insurance physical.  Reviewed her chart with her and do not see any previous glucose readings in the prediabetic range.  Recommend repeat of fasting glucose in the future.  Future order placed.  She will continue with her current exercise and nutrition plan.  - Glucose; Future                 Return in about 1 year (around 9/28/2022) for Routine preventive.    Lavinia Roque MD  [facility] is a 36 year old who presents for the following health issues     HPI       Hypothyroidism Follow-up      Since last visit, patient describes the following symptoms: Weight stable, no hair loss, no skin changes, no constipation, no loose stools    Patient reports that things are going well. No changes.   Taking levothyroxine 75 mcg without any problems.  She denies any symptoms changes or weight concerns.      2 weeks had to do an insurance physical and had a glucose at 100.   \"eating my feelings\" at work previously. Changed jobs now.   Exercising regularly.  She is confused by those results.  She has not had prior similar results.    Patient reports all GI symptoms she was experiencing earlier this year are improved.        Review of Systems   CONSTITUTIONAL: NEGATIVE for fever, " chills, change in weight  INTEGUMENTARY/SKIN: NEGATIVE for worrisome rashes, moles or lesions  ENT/MOUTH: NEGATIVE for ear, mouth and throat problems  RESP: NEGATIVE for significant cough or SOB  CV: NEGATIVE for chest pain, palpitations or peripheral edema      Objective           Vitals:  No vitals were obtained today due to virtual visit.    Physical Exam   healthy, alert and no distress  PSYCH: Alert and oriented times 3; coherent speech, normal   rate and volume, able to articulate logical thoughts, able   to abstract reason, no tangential thoughts, no hallucinations   or delusions  Her affect is normal and pleasant  RESP: No cough, no audible wheezing, able to talk in full sentences  Remainder of exam unable to be completed due to telephone visits                Phone call duration: 8 minutes

## 2021-09-28 ENCOUNTER — VIRTUAL VISIT (OUTPATIENT)
Dept: FAMILY MEDICINE | Facility: CLINIC | Age: 36
End: 2021-09-28
Payer: COMMERCIAL

## 2021-09-28 DIAGNOSIS — E03.4 HYPOTHYROIDISM DUE TO ACQUIRED ATROPHY OF THYROID: Primary | ICD-10-CM

## 2021-09-28 DIAGNOSIS — R73.01 IMPAIRED FASTING GLUCOSE: ICD-10-CM

## 2021-09-28 PROCEDURE — 99213 OFFICE O/P EST LOW 20 MIN: CPT | Mod: 95 | Performed by: FAMILY MEDICINE

## 2021-09-28 RX ORDER — LEVOTHYROXINE SODIUM 75 UG/1
75 TABLET ORAL DAILY
Qty: 90 TABLET | Refills: 3 | Status: SHIPPED | OUTPATIENT
Start: 2021-09-28 | End: 2022-09-27

## 2021-10-04 ENCOUNTER — HEALTH MAINTENANCE LETTER (OUTPATIENT)
Age: 36
End: 2021-10-04

## 2022-01-13 NOTE — PATIENT INSTRUCTIONS
Take at home pregnancy test in 2 weeks, return to clinic if test is positive or if having new or worsening symptoms.    Allergy;

## 2022-01-19 ENCOUNTER — E-VISIT (OUTPATIENT)
Dept: URGENT CARE | Facility: URGENT CARE | Age: 37
End: 2022-01-19
Payer: COMMERCIAL

## 2022-01-19 DIAGNOSIS — N39.0 ACUTE UTI (URINARY TRACT INFECTION): Primary | ICD-10-CM

## 2022-01-19 PROCEDURE — 99421 OL DIG E/M SVC 5-10 MIN: CPT | Performed by: FAMILY MEDICINE

## 2022-01-19 RX ORDER — NITROFURANTOIN 25; 75 MG/1; MG/1
100 CAPSULE ORAL 2 TIMES DAILY
Qty: 10 CAPSULE | Refills: 0 | Status: SHIPPED | OUTPATIENT
Start: 2022-01-19 | End: 2022-01-24

## 2022-01-19 NOTE — PATIENT INSTRUCTIONS
Dear Gretta Muñoz    After reviewing your responses, I've been able to diagnose you with a urinary tract infection, which is a common infection of the bladder with bacteria.  This is not a sexually transmitted infection, though urinating immediately after intercourse can help prevent infections.  Drinking lots of fluids is also helpful to clear your current infection and prevent the next one.      I have sent a prescription for antibiotics to your pharmacy to treat this infection.    It is important that you take all of your prescribed medication even if your symptoms are improving after a few doses.  Taking all of your medicine helps prevent the symptoms from returning.     If your symptoms worsen, you develop pain in your back or stomach, develop fevers, or are not improving in 5 days, please contact your primary care provider for an appointment or visit any of our convenient Walk-in or Urgent Care Centers to be seen, which can be found on our website here.    Thanks again for choosing us as your health care partner,    Joaquim Keller, DO

## 2022-01-23 ENCOUNTER — HEALTH MAINTENANCE LETTER (OUTPATIENT)
Age: 37
End: 2022-01-23

## 2022-03-02 ENCOUNTER — E-VISIT (OUTPATIENT)
Dept: URGENT CARE | Facility: CLINIC | Age: 37
End: 2022-03-02
Payer: COMMERCIAL

## 2022-03-02 ENCOUNTER — LAB (OUTPATIENT)
Dept: LAB | Facility: CLINIC | Age: 37
End: 2022-03-02
Payer: COMMERCIAL

## 2022-03-02 DIAGNOSIS — R30.0 DYSURIA: Primary | ICD-10-CM

## 2022-03-02 DIAGNOSIS — B37.31 YEAST VAGINITIS: Primary | ICD-10-CM

## 2022-03-02 DIAGNOSIS — R30.0 DYSURIA: ICD-10-CM

## 2022-03-02 LAB
ALBUMIN UR-MCNC: NEGATIVE MG/DL
APPEARANCE UR: CLEAR
BACTERIA #/AREA URNS HPF: ABNORMAL /HPF
BILIRUB UR QL STRIP: NEGATIVE
CLUE CELLS: ABNORMAL
COLOR UR AUTO: YELLOW
GLUCOSE UR STRIP-MCNC: NEGATIVE MG/DL
HGB UR QL STRIP: NEGATIVE
KETONES UR STRIP-MCNC: NEGATIVE MG/DL
LEUKOCYTE ESTERASE UR QL STRIP: ABNORMAL
NITRATE UR QL: NEGATIVE
PH UR STRIP: 7 [PH] (ref 5–7)
RBC #/AREA URNS AUTO: ABNORMAL /HPF
SP GR UR STRIP: 1.01 (ref 1–1.03)
SQUAMOUS #/AREA URNS AUTO: ABNORMAL /LPF
TRICHOMONAS, WET PREP: ABNORMAL
UROBILINOGEN UR STRIP-ACNC: 0.2 E.U./DL
WBC #/AREA URNS AUTO: ABNORMAL /HPF
WBC'S/HIGH POWER FIELD, WET PREP: ABNORMAL
YEAST, WET PREP: PRESENT

## 2022-03-02 PROCEDURE — 81001 URINALYSIS AUTO W/SCOPE: CPT

## 2022-03-02 PROCEDURE — 87210 SMEAR WET MOUNT SALINE/INK: CPT | Performed by: PHYSICIAN ASSISTANT

## 2022-03-02 PROCEDURE — 99421 OL DIG E/M SVC 5-10 MIN: CPT | Performed by: PHYSICIAN ASSISTANT

## 2022-03-02 RX ORDER — FLUCONAZOLE 150 MG/1
150 TABLET ORAL ONCE
Qty: 1 TABLET | Refills: 0 | Status: SHIPPED | OUTPATIENT
Start: 2022-03-02 | End: 2022-03-02

## 2022-03-02 NOTE — PATIENT INSTRUCTIONS
Dear Gretta Muñoz,     After reviewing your responses, I would like you to come in for a urine test to make sure we treat you correctly. This urine test is to evaluate you for a possible urinary tract infection, and should be scheduled for today or tomorrow. Schedule a Lab Only appointment here.     Lab appointments are not available at most locations on the weekends. If no Lab Only appointment is available, you should be seen in any of our convenient Walk-in or Urgent Care Centers, which can be found on our website here.     You will receive instructions with your results in [employer] once they are available.     If your symptoms worsen, you develop pain in your back or stomach, develop fevers, or are not improving in 5 days, please contact your primary care provider for an appointment or visit a Walk-in or Urgent Care Center to be seen.     Thanks again for choosing us as your health care partner,     Ni Paulino PA-C

## 2022-09-11 ENCOUNTER — HEALTH MAINTENANCE LETTER (OUTPATIENT)
Age: 37
End: 2022-09-11

## 2022-09-26 DIAGNOSIS — E03.4 HYPOTHYROIDISM DUE TO ACQUIRED ATROPHY OF THYROID: ICD-10-CM

## 2022-09-27 RX ORDER — LEVOTHYROXINE SODIUM 75 UG/1
TABLET ORAL
Qty: 90 TABLET | Refills: 0 | Status: SHIPPED | OUTPATIENT
Start: 2022-09-27 | End: 2022-11-30

## 2022-09-27 NOTE — TELEPHONE ENCOUNTER
Pending Prescriptions:                       Disp   Refills    EUTHYROX 75 MCG tablet [Pharmacy Med Name*90 tab*0            Sig: Take 1 tablet by mouth once daily    Medication is being filled for 1 time sarahi refill only due to:  Patient is due for annual with labs    Please call and help schedule.  Thank you!

## 2022-11-11 ENCOUNTER — TRANSFERRED RECORDS (OUTPATIENT)
Dept: HEALTH INFORMATION MANAGEMENT | Facility: CLINIC | Age: 37
End: 2022-11-11

## 2022-11-11 LAB
ALT SERPL-CCNC: 17 IU/L (ref 0–32)
AST SERPL-CCNC: 17 IU/L (ref 0–40)
CREATININE (EXTERNAL): 0.86 MG/DL (ref 0.57–1)
GFR ESTIMATED (EXTERNAL): 89 ML/MIN/1.73
GLUCOSE (EXTERNAL): 95 MG/DL (ref 70–99)
POTASSIUM (EXTERNAL): 4 MMOL/L (ref 3.5–5.2)
TSH SERPL-ACNC: 1.65 UIU/ML (ref 0.45–4.5)

## 2022-11-28 ENCOUNTER — TELEPHONE (OUTPATIENT)
Dept: FAMILY MEDICINE | Facility: CLINIC | Age: 37
End: 2022-11-28

## 2022-11-28 NOTE — TELEPHONE ENCOUNTER
Staff called patient who stated she would keep the 11/30 and come into clinic.     Staff updated appointment.

## 2022-11-28 NOTE — TELEPHONE ENCOUNTER
Gretta has a virtual appointment on Wednesday which needs to be in person.     I can see her in person at that same time or I can offer 7:30 am tomorrow (7:15 am arrival time)

## 2022-11-30 ENCOUNTER — OFFICE VISIT (OUTPATIENT)
Dept: FAMILY MEDICINE | Facility: CLINIC | Age: 37
End: 2022-11-30
Payer: COMMERCIAL

## 2022-11-30 VITALS
RESPIRATION RATE: 14 BRPM | BODY MASS INDEX: 26.51 KG/M2 | TEMPERATURE: 99 F | SYSTOLIC BLOOD PRESSURE: 139 MMHG | HEART RATE: 66 BPM | DIASTOLIC BLOOD PRESSURE: 78 MMHG | OXYGEN SATURATION: 100 % | WEIGHT: 195.7 LBS | HEIGHT: 72 IN

## 2022-11-30 DIAGNOSIS — E03.4 HYPOTHYROIDISM DUE TO ACQUIRED ATROPHY OF THYROID: Primary | ICD-10-CM

## 2022-11-30 DIAGNOSIS — M25.562 LEFT KNEE PAIN, UNSPECIFIED CHRONICITY: ICD-10-CM

## 2022-11-30 PROCEDURE — 99214 OFFICE O/P EST MOD 30 MIN: CPT | Performed by: FAMILY MEDICINE

## 2022-11-30 RX ORDER — LEVOTHYROXINE SODIUM 75 UG/1
75 TABLET ORAL DAILY
Qty: 90 TABLET | Refills: 3 | Status: SHIPPED | OUTPATIENT
Start: 2022-11-30 | End: 2023-12-20

## 2022-11-30 ASSESSMENT — PAIN SCALES - GENERAL: PAINLEVEL: NO PAIN (0)

## 2022-11-30 NOTE — PROGRESS NOTES
"  Assessment & Plan     Hypothyroidism due to acquired atrophy of thyroid  Patient brought lab results with her from 2 weeks ago and these will be scanned to the chart.  Patient that her TSH and T4 are in the normal range.  Will continue with her current dose of medication 75 mcg of levothyroxine daily.  1 year refills given.  Recheck in 1 year or sooner if needed.  - levothyroxine (EUTHYROX) 75 MCG tablet; Take 1 tablet (75 mcg) by mouth daily    Left knee pain, unspecified chronicity  Patient with left knee pain which has altered her activity level.  No injury was noted.  Her exam is within normal limits.  Recommend physical therapy to assist with further evaluation and treatment.  She will reach out if not improving with these measures.  She can continue with ice as needed.  - Physical Therapy Referral; Future             BMI:   Estimated body mass index is 26.08 kg/m  as calculated from the following:    Height as of this encounter: 1.845 m (6' 0.64\").    Weight as of this encounter: 88.8 kg (195 lb 11.2 oz).   Weight management plan: Discussed healthy diet and exercise guidelines        No follow-ups on file.   Follow-up Visit   Expected date:  Nov 30, 2023 (Approximate)      Follow Up Appointment Details:     Follow-up with whom?: Me    Follow-Up for what?: Adult Preventive    Any Additional Chronic Condition Management?: Hypothyroidism    How?: In Person                    Lavinia Roque MD  [facility] VANNESA Glynn is a 37 year old presenting for the following health issues:  Recheck Medication and Thyroid Problem      History of Present Illness       Hypothyroidism:     Since last visit, patient describes the following symptoms::  Weight gain    Weight gain::  16-20 lbs.    She eats 2-3 servings of fruits and vegetables daily.She consumes 0 sweetened beverage(s) daily.She exercises with enough effort to increase her heart rate 30 to 60 minutes per day.  She exercises with " "enough effort to increase her heart rate 4 days per week.   She is taking medications regularly.     Knee pain since July. Left medial. With squats and lunges. Saw a chiropractor. Hasn't improved. Stopped running. No injury. No swelling. Has tried ice. Not seeming to improve. No numbness or tingling.         Review of Systems   CONSTITUTIONAL: NEGATIVE for fever, chills, change in weight  ENT/MOUTH: NEGATIVE for ear, mouth and throat problems  RESP: NEGATIVE for significant cough or SOB  CV: NEGATIVE for chest pain, palpitations or peripheral edema      Objective    /78 (BP Location: Left arm, Patient Position: Sitting, Cuff Size: Adult Regular)   Pulse 66   Temp 99  F (37.2  C) (Temporal)   Resp 14   Ht 1.845 m (6' 0.64\")   Wt 88.8 kg (195 lb 11.2 oz)   LMP 11/27/2022 (Exact Date)   SpO2 100%   Breastfeeding No   BMI 26.08 kg/m    Body mass index is 26.08 kg/m .  Physical Exam   GENERAL: healthy, alert and no distress  NECK: no adenopathy, no asymmetry, masses, or scars and thyroid normal to palpation  RESP: lungs clear to auscultation - no rales, rhonchi or wheezes  CV: regular rate and rhythm, normal S1 S2, no S3 or S4, no murmur, click or rub, no peripheral edema and peripheral pulses strong  MS: Left knee without erythema, effusion.  Normal patellar motion.  Full range of motion with flexion and extension.  No tenderness along the joint margins.  Stable to varus and valgus stresses and negative Lachman's test.                    "

## 2022-12-05 ENCOUNTER — THERAPY VISIT (OUTPATIENT)
Dept: PHYSICAL THERAPY | Facility: CLINIC | Age: 37
End: 2022-12-05
Attending: FAMILY MEDICINE
Payer: COMMERCIAL

## 2022-12-05 DIAGNOSIS — M25.562 LEFT KNEE PAIN, UNSPECIFIED CHRONICITY: ICD-10-CM

## 2022-12-05 DIAGNOSIS — M25.562 LEFT KNEE PAIN: Primary | ICD-10-CM

## 2022-12-05 PROCEDURE — 97161 PT EVAL LOW COMPLEX 20 MIN: CPT | Mod: GP | Performed by: PHYSICAL THERAPIST

## 2022-12-05 PROCEDURE — 97110 THERAPEUTIC EXERCISES: CPT | Mod: GP | Performed by: PHYSICAL THERAPIST

## 2022-12-05 ASSESSMENT — ACTIVITIES OF DAILY LIVING (ADL)
WALK: ACTIVITY IS MINIMALLY DIFFICULT
KNEE_ACTIVITY_OF_DAILY_LIVING_SUM: 54
WEAKNESS: I HAVE THE SYMPTOM BUT IT DOES NOT AFFECT MY ACTIVITY
RISE FROM A CHAIR: ACTIVITY IS SOMEWHAT DIFFICULT
STIFFNESS: I DO NOT HAVE THE SYMPTOM
RAW_SCORE: 54
STAND: ACTIVITY IS MINIMALLY DIFFICULT
LIMPING: THE SYMPTOM AFFECTS MY ACTIVITY SLIGHTLY
KNEE_ACTIVITY_OF_DAILY_LIVING_SCORE: 77.14
KNEEL ON THE FRONT OF YOUR KNEE: ACTIVITY IS NOT DIFFICULT
GO DOWN STAIRS: ACTIVITY IS MINIMALLY DIFFICULT
GO UP STAIRS: ACTIVITY IS SOMEWHAT DIFFICULT
PAIN: THE SYMPTOM AFFECTS MY ACTIVITY SLIGHTLY
SWELLING: I DO NOT HAVE THE SYMPTOM
GIVING WAY, BUCKLING OR SHIFTING OF KNEE: I HAVE THE SYMPTOM BUT IT DOES NOT AFFECT MY ACTIVITY
SIT WITH YOUR KNEE BENT: ACTIVITY IS NOT DIFFICULT
SQUAT: ACTIVITY IS FAIRLY DIFFICULT

## 2022-12-05 NOTE — PROGRESS NOTES
Physical Therapy Initial Evaluation  Subjective:  The history is provided by the patient. No  was used.   Patient Health History  Gretta Muñoz being seen for Left knee pain.     Problem began: 7/1/2022.   Problem occurred: Random- no specific injury that I can remember. I am a runner so i dont know if its from repeated bad form or overuse   Pain is reported as 3/10 on pain scale.  General health as reported by patient is good.  Pertinent medical history includes: thyroid problems.   Red flags:  None as reported by patient.  Medical allergies: none.   Surgeries include:  None.    Current medications:  Thyroid medication.    Current occupation is .   Primary job tasks include:  Computer work.                  Therapist Generated HPI Evaluation  Problem details: Has been having SHELDON knee pain since July,  L knee pain greater than R. She had increased her running that summer and started noticing the pain. Now has the most pain with squatting and getting up after sitting for longer periods. Pain is 7/10 at its worse. Has had L hip problems in the past and went to the chiropractor for them with some relief after. .         Type of problem:  Bilateral knees.    This is a chronic condition.  Condition occurred with:  Insidious onset.  Where condition occurred: during recreation/sport.  Patient reports pain:  Anterior and medial.  Pain is described as sharp and is intermittent.  Pain radiates to:  Knee. Pain timing: activity dependent   Since onset symptoms are unchanged.  Associated symptoms:  Loss of motion/stiffness and loss of strength (clicking when squatting). Symptoms are exacerbated by running, bending/squatting and sitting  and relieved by ice and rest.  Imaging testing: none.  Previous treatment includes chiropractic. There was mild (K tape ) improvement following previous treatment.  Restrictions due to condition include:  Working in normal job without restrictions.  Barriers  include:  None as reported by patient.                        Objective:  System    Physical Exam    General     ROS  Gretta Muñoz , : 1985, MRN: [id number]    Physical Therapy Objective Findings  Subjective information, goals, clinical impression, daily documentation and other information found in EPISODES tab.  Knee Objective Findings    Posture: rounded shoulders, excess knee anterior translation when bending down    Gait:  hyperpronation    Foot Position in Standing:  wnl    Lumbar Screen:     Hip Screen: scour & ROM wnl, no pain noted     Range of Motion:                                    Right AROM            Right PROM Left AROM              Left PROM                Extension wnl  Wnl+     Flexion wnl  wnl    Other:         Manual Muscle Testing  (graded 0-5, measured at 0 degrees unless otherwise noted):                                                                       Right                                                  Left                                                      Flexion   5        4   Extension 5 4   Hip Abduction 5 5   Quad Set 5 5   VMO     Other:     (+ mild pain, ++ moderate pain, +++ severe pain)    Edema:                                                                        Right                                                  Left                                                        Fat pad swelling noted       Special Tests:                                                                    Right                                                   Left                                                      Step Test Height 6inch 6inch          -Control Comment Min valgus Mod valgus    Functional Squat (deg.) 80* 80* anterior translation   Lachmans  -   Posterior Sag     Anterior Drawer  -   Posterior Drawer     Varus at 0 & 30     Valgus at 0 & 30     Kalli's  + in end range valgus for pain & clicking   Apley's Distraction     Apley's Compression     External  Rotation Test     thessaly  +      Flexibility:                                                                    Right                                                   Left                                                      Gastroc     Soleus     Hamstrings     Hip Flexors     Quadricep     ITB            Joint Mobility                                                                       Right                                                   Left                                                       Patellar Static Position  wnl   Patellar Passive Mobility  wnl   Patellar Dynamic Mobility  Mild lateral translation    Proximal Tib-fib     Tibiofemoral            Palpation: no pain with palpation to joint line or posterior knee    Assessment/Plan:    Patient is a 37 year old female with left side knee complaints.    Patient has the following significant findings with corresponding treatment plan.                Diagnosis 1:  L knee pain consistent with medial meniscus irritation  Pain -  manual therapy, splint/taping/bracing/orthotics, education and home program  Decreased strength - therapeutic exercise, therapeutic activities and home program  Edema - cold therapy and self management/home program  Impaired gait - gait training and home program    Therapy Evaluation Codes:   1) History comprised of:   Personal factors that impact the plan of care:      None.    Comorbidity factors that impact the plan of care are:      None.     Medications impacting care: Thyroid .  2) Examination of Body Systems comprised of:   Body structures and functions that impact the plan of care:      Knee.   Activity limitations that impact the plan of care are:      Running, Sitting, Squatting/kneeling and Stairs.  3) Clinical presentation characteristics are:   Stable/Uncomplicated.  4) Decision-Making    Low complexity using standardized patient assessment instrument and/or measureable assessment of functional outcome.  Cumulative  Therapy Evaluation is: Low complexity.    Previous and current functional limitations:  (See Goal Flow Sheet for this information)    Short term and Long term goals: (See Goal Flow Sheet for this information)     Communication ability:  Patient appears to be able to clearly communicate and understand verbal and written communication and follow directions correctly.  Treatment Explanation - The following has been discussed with the patient:   RX ordered/plan of care  Anticipated outcomes  Possible risks and side effects  This patient would benefit from PT intervention to resume normal activities.   Rehab potential is good.    Frequency:  1 X a week, once daily  Duration:  for 4 weeks tapering to 2 X a month over 8 weeks  Discharge Plan:  Achieve all LTG.  Independent in home treatment program.    Please refer to the daily flowsheet for treatment today, total treatment time and time spent performing 1:1 timed codes.     Diana Singletary, SPT; Matt Evans, PT

## 2022-12-15 ENCOUNTER — THERAPY VISIT (OUTPATIENT)
Dept: PHYSICAL THERAPY | Facility: CLINIC | Age: 37
End: 2022-12-15
Payer: COMMERCIAL

## 2022-12-15 DIAGNOSIS — M25.562 LEFT KNEE PAIN: Primary | ICD-10-CM

## 2022-12-15 PROCEDURE — 97112 NEUROMUSCULAR REEDUCATION: CPT | Mod: GP | Performed by: PHYSICAL THERAPIST

## 2022-12-15 PROCEDURE — 97110 THERAPEUTIC EXERCISES: CPT | Mod: GP | Performed by: PHYSICAL THERAPIST

## 2022-12-29 ENCOUNTER — THERAPY VISIT (OUTPATIENT)
Dept: PHYSICAL THERAPY | Facility: CLINIC | Age: 37
End: 2022-12-29
Payer: COMMERCIAL

## 2022-12-29 DIAGNOSIS — M25.562 LEFT KNEE PAIN: Primary | ICD-10-CM

## 2022-12-29 DIAGNOSIS — M25.562 LEFT KNEE PAIN, UNSPECIFIED CHRONICITY: ICD-10-CM

## 2022-12-29 PROCEDURE — 97140 MANUAL THERAPY 1/> REGIONS: CPT | Mod: GP | Performed by: PHYSICAL THERAPIST

## 2022-12-29 PROCEDURE — 97110 THERAPEUTIC EXERCISES: CPT | Mod: GP | Performed by: PHYSICAL THERAPIST

## 2022-12-29 PROCEDURE — 97112 NEUROMUSCULAR REEDUCATION: CPT | Mod: GP | Performed by: PHYSICAL THERAPIST

## 2022-12-29 ASSESSMENT — ACTIVITIES OF DAILY LIVING (ADL)
RISE FROM A CHAIR: ACTIVITY IS MINIMALLY DIFFICULT
SWELLING: I DO NOT HAVE THE SYMPTOM
GIVING WAY, BUCKLING OR SHIFTING OF KNEE: I DO NOT HAVE THE SYMPTOM
KNEE_ACTIVITY_OF_DAILY_LIVING_SUM: 63
WEAKNESS: I DO NOT HAVE THE SYMPTOM
STAND: ACTIVITY IS NOT DIFFICULT
STIFFNESS: I DO NOT HAVE THE SYMPTOM
SIT WITH YOUR KNEE BENT: ACTIVITY IS NOT DIFFICULT
HOW_WOULD_YOU_RATE_THE_CURRENT_FUNCTION_OF_YOUR_KNEE_DURING_YOUR_USUAL_DAILY_ACTIVITIES_ON_A_SCALE_FROM_0_TO_100_WITH_100_BEING_YOUR_LEVEL_OF_KNEE_FUNCTION_PRIOR_TO_YOUR_INJURY_AND_0_BEING_THE_INABILITY_TO_PERFORM_ANY_OF_YOUR_USUAL_DAILY_ACTIVITIES?: 90
GO DOWN STAIRS: ACTIVITY IS MINIMALLY DIFFICULT
KNEEL ON THE FRONT OF YOUR KNEE: ACTIVITY IS NOT DIFFICULT
PAIN: THE SYMPTOM AFFECTS MY ACTIVITY SLIGHTLY
LIMPING: I DO NOT HAVE THE SYMPTOM
WALK: ACTIVITY IS NOT DIFFICULT
RAW_SCORE: 63
GO UP STAIRS: ACTIVITY IS MINIMALLY DIFFICULT
SQUAT: ACTIVITY IS SOMEWHAT DIFFICULT
KNEE_ACTIVITY_OF_DAILY_LIVING_SCORE: 90

## 2023-01-12 ENCOUNTER — THERAPY VISIT (OUTPATIENT)
Dept: PHYSICAL THERAPY | Facility: CLINIC | Age: 38
End: 2023-01-12
Payer: COMMERCIAL

## 2023-01-12 DIAGNOSIS — M25.562 LEFT KNEE PAIN: Primary | ICD-10-CM

## 2023-01-12 PROCEDURE — 97140 MANUAL THERAPY 1/> REGIONS: CPT | Mod: GP | Performed by: PHYSICAL THERAPIST

## 2023-01-12 PROCEDURE — 97110 THERAPEUTIC EXERCISES: CPT | Mod: GP | Performed by: PHYSICAL THERAPIST

## 2023-01-12 PROCEDURE — 97112 NEUROMUSCULAR REEDUCATION: CPT | Mod: GP | Performed by: PHYSICAL THERAPIST

## 2023-01-19 ENCOUNTER — THERAPY VISIT (OUTPATIENT)
Dept: PHYSICAL THERAPY | Facility: CLINIC | Age: 38
End: 2023-01-19
Payer: COMMERCIAL

## 2023-01-19 DIAGNOSIS — M25.562 LEFT KNEE PAIN: Primary | ICD-10-CM

## 2023-01-19 PROCEDURE — 97035 APP MDLTY 1+ULTRASOUND EA 15: CPT | Mod: GP | Performed by: PHYSICAL THERAPIST

## 2023-01-19 PROCEDURE — 97112 NEUROMUSCULAR REEDUCATION: CPT | Mod: GP | Performed by: PHYSICAL THERAPIST

## 2023-01-19 PROCEDURE — 97110 THERAPEUTIC EXERCISES: CPT | Mod: GP | Performed by: PHYSICAL THERAPIST

## 2023-02-22 ENCOUNTER — OFFICE VISIT (OUTPATIENT)
Dept: FAMILY MEDICINE | Facility: CLINIC | Age: 38
End: 2023-02-22
Payer: COMMERCIAL

## 2023-02-22 VITALS
BODY MASS INDEX: 26.95 KG/M2 | WEIGHT: 199 LBS | SYSTOLIC BLOOD PRESSURE: 130 MMHG | TEMPERATURE: 98.2 F | HEIGHT: 72 IN | DIASTOLIC BLOOD PRESSURE: 82 MMHG | RESPIRATION RATE: 16 BRPM | OXYGEN SATURATION: 100 % | HEART RATE: 66 BPM

## 2023-02-22 DIAGNOSIS — Z30.46 ENCOUNTER FOR NEXPLANON REMOVAL: Primary | ICD-10-CM

## 2023-02-22 PROCEDURE — 11982 REMOVE DRUG IMPLANT DEVICE: CPT | Performed by: FAMILY MEDICINE

## 2023-02-22 RX ORDER — LIDOCAINE HYDROCHLORIDE AND EPINEPHRINE 10; 10 MG/ML; UG/ML
1 INJECTION, SOLUTION INFILTRATION; PERINEURAL ONCE
Status: COMPLETED | OUTPATIENT
Start: 2023-02-22 | End: 2023-02-22

## 2023-02-22 RX ADMIN — LIDOCAINE HYDROCHLORIDE AND EPINEPHRINE 1 ML: 10; 10 INJECTION, SOLUTION INFILTRATION; PERINEURAL at 09:28

## 2023-02-22 ASSESSMENT — PAIN SCALES - GENERAL: PAINLEVEL: NO PAIN (0)

## 2023-02-22 NOTE — PROGRESS NOTES
Assessment & Plan   1. Encounter for Nexplanon removal:   - REMOVAL IMPLATABLE CONTRACEPTIVE CAPSULE    Procedure Note-Nexplanon Removal    Gretta Muñoz is a patient of Lavinia Rodriguez here for removal of etonogestrel implant Nexplanon/Implanon.    Indication: Desire nexplanon removal    Consent: Affirmation of informed consent was signed and scanned into the medical record. Risks, benefits and alternatives were discussed. Patient's questions were elicited and answered.     Procedure safety checklist was completed:  Yes  Time Out (Pause for the Cause) completed: Yes    Preoperative Diagnosis: etonogestrel implant    Postoperative Diagnosis: etonogestrel implant removed    Technique:   Skin prep Betadine  Anesthesia 1% lidocaine, with epi  Procedure: Small incision (<5mm) was made at distal end of palpable implant, curved hemostat was used to isolate the implant and bring it to the incision, the fibrous capsule containing the implant  was incised and the Implant was removed intact.  EBL: minimal  Complications:  Yes  Tolerance:  Pt tolerated procedure well and was in stable condition.     Contraception was discussed and patient chose the following method none/vasectomy.    Follow up: Pt was instructed to call if bleeding, severe pain or foul smell.     Chang Cortes MD  [facility]    Disclaimer: This note consists of symbols derived from keyboarding, dictation and/or voice recognition software. As a result, there may be errors in the script that have gone undetected. Please consider this when interpreting information found in this chart.    Subjective   Gretta is a 38 year old, presenting for the following health issues:  Procedure (Nexplanon removal)    HPI     Desires nexplanon removal. Inserted ~2 years ago.  had vasectomy. No other concerns.    Review of Systems   Constitutional, HEENT, cardiovascular, pulmonary, gi and gu systems are negative, except as  "otherwise noted.      Objective    /82 (BP Location: Left arm, Patient Position: Chair, Cuff Size: Adult Regular)   Pulse 66   Temp 98.2  F (36.8  C) (Temporal)   Resp 16   Ht 1.861 m (6' 1.25\")   Wt 90.3 kg (199 lb)   LMP 02/15/2023 (Exact Date)   SpO2 100%   Breastfeeding No   BMI 26.08 kg/m    Body mass index is 26.08 kg/m .  Physical Exam   General: Appears well and in no acute distress.  Cardiovascular: Regular rate and rhythm, normal S1 and S2 without murmur. No extra heartsounds or friction rub. Radial pulses present and equal bilaterally.  Respiratory: Lungs clear to auscultation bilaterally. No wheezing or crackles. No prolonged expiration. Symmetrical chest rise.  Musculoskeletal: No gross extremity deformities. No peripheral edema. Normal muscle bulk.    Labs: none            "

## 2023-03-09 NOTE — PROGRESS NOTES
Subjective:  HPI  Physical Exam                    Objective:  System    Physical Exam    General     ROS    Assessment/Plan:    DISCHARGE REPORT    Progress reporting period is from 12/5/22 to 1/19/23.       SUBJECTIVE  Subjective changes noted by patient: Per SOAP note 1/19/23: Gretta denies L Knee pain on stairs ascending or descending. Able to perform squats but avoids deep squats due to knee pain. Not able to fully extend knee when supine.    Current Pain level: 0/10 (2-/10 deep squats).     Initial Pain level: 3/10.   Changes in function:  Yes (See Goal flowsheet attached for changes in current functional level)  Adverse reaction to treatment or activity: None    OBJECTIVE  Changes noted in objective findings:  Patient has failed to return to therapy so current objective findings are unknown.  Per SOAP note 1/19/23: TTP L MCL. PROM Extension L = 0 with pain and R = 10 degrees hyperextension.     ASSESSMENT/PLAN  Updated problem list and treatment plan: Diagnosis 1:  L Knee Pain  Pain -  hot/cold therapy, US, manual therapy and splint/taping/bracing/orthotics  Decreased ROM/flexibility - manual therapy, therapeutic exercise and home program  Decreased joint mobility - manual therapy, therapeutic exercise, therapeutic activity and home program  Decreased strength - therapeutic exercise, therapeutic activities and home program  Impaired muscle performance - neuro re-education and home program  Decreased function - therapeutic activities and home program  STG/LTGs have been met or progress has been made towards goals:  Yes (See Goal flow sheet completed today.)  Assessment of Progress: The patient's condition is improving.  The patient has not returned to therapy. Current status is unknown.  Patient is meeting short term goals and is progressing towards long term goals.  Self Management Plans:  Patient is independent in a home treatment program.  Patient is independent in self management of symptoms.  I have  re-evaluated this patient and find that the nature, scope, duration and intensity of the therapy is appropriate for the medical condition of the patient.  Gretta continues to require the following intervention to meet STG and LTG's:  PT intervention is no longer required to meet STG/LTG.    Recommendations:  This patient is ready to be discharged from therapy and continue their home treatment program.    Please refer to the daily flowsheet for treatment today, total treatment time and time spent performing 1:1 timed codes.

## 2023-04-30 ENCOUNTER — HEALTH MAINTENANCE LETTER (OUTPATIENT)
Age: 38
End: 2023-04-30

## 2023-11-06 ENCOUNTER — E-VISIT (OUTPATIENT)
Dept: URGENT CARE | Facility: CLINIC | Age: 38
End: 2023-11-06
Payer: COMMERCIAL

## 2023-11-06 DIAGNOSIS — N39.0 ACUTE UTI (URINARY TRACT INFECTION): Primary | ICD-10-CM

## 2023-11-06 PROCEDURE — 99421 OL DIG E/M SVC 5-10 MIN: CPT | Performed by: NURSE PRACTITIONER

## 2023-11-06 RX ORDER — NITROFURANTOIN 25; 75 MG/1; MG/1
100 CAPSULE ORAL 2 TIMES DAILY
Qty: 10 CAPSULE | Refills: 0 | Status: SHIPPED | OUTPATIENT
Start: 2023-11-06 | End: 2023-11-11

## 2023-11-06 NOTE — PATIENT INSTRUCTIONS
Dear Gretta Muñoz    After reviewing your responses, I've been able to diagnose you with a urinary tract infection, which is a common infection of the bladder with bacteria.  This is not a sexually transmitted infection, though urinating immediately after intercourse can help prevent infections.  Drinking lots of fluids is also helpful to clear your current infection and prevent the next one.      I have sent a prescription for antibiotics to your pharmacy to treat this infection.    It is important that you take all of your prescribed medication even if your symptoms are improving after a few doses.  Taking all of your medicine helps prevent the symptoms from returning.     If your symptoms worsen, you develop pain in your back or stomach, develop fevers, or are not improving in 5 days, please contact your primary care provider for an appointment or visit any of our convenient Walk-in or Urgent Care Centers to be seen, which can be found on our website here.    Thanks again for choosing us as your health care partner,    MICHELET Samano CNP

## 2023-12-12 ASSESSMENT — ENCOUNTER SYMPTOMS
ABDOMINAL PAIN: 0
FEVER: 0
SORE THROAT: 0
CONSTIPATION: 0
HEMATOCHEZIA: 0
NERVOUS/ANXIOUS: 0
BREAST MASS: 0
HEMATURIA: 0
JOINT SWELLING: 0
DIZZINESS: 0
MYALGIAS: 0
WEAKNESS: 0
SHORTNESS OF BREATH: 0
PALPITATIONS: 0
NAUSEA: 0
HEARTBURN: 0
EYE PAIN: 0
FREQUENCY: 0
HEADACHES: 0
PARESTHESIAS: 0
CHILLS: 0
ARTHRALGIAS: 0
DYSURIA: 0
COUGH: 0
DIARRHEA: 0

## 2023-12-19 ENCOUNTER — OFFICE VISIT (OUTPATIENT)
Dept: FAMILY MEDICINE | Facility: CLINIC | Age: 38
End: 2023-12-19
Attending: FAMILY MEDICINE
Payer: COMMERCIAL

## 2023-12-19 VITALS
HEIGHT: 72 IN | OXYGEN SATURATION: 100 % | SYSTOLIC BLOOD PRESSURE: 126 MMHG | DIASTOLIC BLOOD PRESSURE: 76 MMHG | TEMPERATURE: 98.7 F | BODY MASS INDEX: 27.3 KG/M2 | WEIGHT: 201.56 LBS | RESPIRATION RATE: 16 BRPM | HEART RATE: 67 BPM

## 2023-12-19 DIAGNOSIS — Z13.1 SCREENING FOR DIABETES MELLITUS: ICD-10-CM

## 2023-12-19 DIAGNOSIS — E04.1 THYROID NODULE: ICD-10-CM

## 2023-12-19 DIAGNOSIS — Z00.00 ROUTINE GENERAL MEDICAL EXAMINATION AT A HEALTH CARE FACILITY: Primary | ICD-10-CM

## 2023-12-19 DIAGNOSIS — E03.4 HYPOTHYROIDISM DUE TO ACQUIRED ATROPHY OF THYROID: ICD-10-CM

## 2023-12-19 DIAGNOSIS — Z13.220 LIPID SCREENING: ICD-10-CM

## 2023-12-19 LAB
ALBUMIN SERPL BCG-MCNC: 4.5 G/DL (ref 3.5–5.2)
ALP SERPL-CCNC: 62 U/L (ref 40–150)
ALT SERPL W P-5'-P-CCNC: 12 U/L (ref 0–50)
ANION GAP SERPL CALCULATED.3IONS-SCNC: 14 MMOL/L (ref 7–15)
AST SERPL W P-5'-P-CCNC: 20 U/L (ref 0–45)
BILIRUB SERPL-MCNC: 0.2 MG/DL
BUN SERPL-MCNC: 16.1 MG/DL (ref 6–20)
CALCIUM SERPL-MCNC: 9.3 MG/DL (ref 8.6–10)
CHLORIDE SERPL-SCNC: 104 MMOL/L (ref 98–107)
CHOLEST SERPL-MCNC: 194 MG/DL
CREAT SERPL-MCNC: 0.7 MG/DL (ref 0.51–0.95)
DEPRECATED HCO3 PLAS-SCNC: 23 MMOL/L (ref 22–29)
EGFRCR SERPLBLD CKD-EPI 2021: >90 ML/MIN/1.73M2
FASTING STATUS PATIENT QL REPORTED: NO
GLUCOSE SERPL-MCNC: 94 MG/DL (ref 70–99)
HDLC SERPL-MCNC: 64 MG/DL
LDLC SERPL CALC-MCNC: 114 MG/DL
NONHDLC SERPL-MCNC: 130 MG/DL
POTASSIUM SERPL-SCNC: 3.8 MMOL/L (ref 3.4–5.3)
PROT SERPL-MCNC: 7.7 G/DL (ref 6.4–8.3)
SODIUM SERPL-SCNC: 141 MMOL/L (ref 135–145)
T4 FREE SERPL-MCNC: 1.21 NG/DL (ref 0.9–1.7)
TRIGL SERPL-MCNC: 82 MG/DL
TSH SERPL DL<=0.005 MIU/L-ACNC: 4.6 UIU/ML (ref 0.3–4.2)

## 2023-12-19 PROCEDURE — 80061 LIPID PANEL: CPT | Performed by: FAMILY MEDICINE

## 2023-12-19 PROCEDURE — 80053 COMPREHEN METABOLIC PANEL: CPT | Performed by: FAMILY MEDICINE

## 2023-12-19 PROCEDURE — 36415 COLL VENOUS BLD VENIPUNCTURE: CPT | Performed by: FAMILY MEDICINE

## 2023-12-19 PROCEDURE — 90471 IMMUNIZATION ADMIN: CPT | Performed by: FAMILY MEDICINE

## 2023-12-19 PROCEDURE — 99214 OFFICE O/P EST MOD 30 MIN: CPT | Mod: 25 | Performed by: FAMILY MEDICINE

## 2023-12-19 PROCEDURE — 84443 ASSAY THYROID STIM HORMONE: CPT | Performed by: FAMILY MEDICINE

## 2023-12-19 PROCEDURE — 84439 ASSAY OF FREE THYROXINE: CPT | Performed by: FAMILY MEDICINE

## 2023-12-19 PROCEDURE — 99395 PREV VISIT EST AGE 18-39: CPT | Mod: 25 | Performed by: FAMILY MEDICINE

## 2023-12-19 PROCEDURE — 90715 TDAP VACCINE 7 YRS/> IM: CPT | Performed by: FAMILY MEDICINE

## 2023-12-19 ASSESSMENT — ENCOUNTER SYMPTOMS
ARTHRALGIAS: 0
HEADACHES: 0
HEMATOCHEZIA: 0
DIARRHEA: 0
DIZZINESS: 0
PARESTHESIAS: 0
COUGH: 0
FREQUENCY: 0
EYE PAIN: 0
FEVER: 0
BREAST MASS: 0
DYSURIA: 0
HEARTBURN: 0
NERVOUS/ANXIOUS: 0
ABDOMINAL PAIN: 0
WEAKNESS: 0
CONSTIPATION: 0
JOINT SWELLING: 0
SHORTNESS OF BREATH: 0
PALPITATIONS: 0
NAUSEA: 0
CHILLS: 0
HEMATURIA: 0
SORE THROAT: 0
MYALGIAS: 0

## 2023-12-19 ASSESSMENT — PAIN SCALES - GENERAL: PAINLEVEL: NO PAIN (0)

## 2023-12-19 NOTE — NURSING NOTE
Prior to immunization administration, verified patients identity using patient s name and date of birth. Please see Immunization Activity for additional information.     Screening Questionnaire for Adult Immunization    Are you sick today?      Do you have allergies to medications, food, a vaccine component or latex?   No   Have you ever had a serious reaction after receiving a vaccination?   No   Do you have a long-term health problem with heart, lung, kidney, or metabolic disease (e.g., diabetes), asthma, a blood disorder, no spleen, complement component deficiency, a cochlear implant, or a spinal fluid leak?  Are you on long-term aspirin therapy?   No   Do you have cancer, leukemia, HIV/AIDS, or any other immune system problem?   No   Do you have a parent, brother, or sister with an immune system problem?   No   In the past 3 months, have you taken medications that affect  your immune system, such as prednisone, other steroids, or anticancer drugs; drugs for the treatment of rheumatoid arthritis, Crohn s disease, or psoriasis; or have you had radiation treatments?   No   Have you had a seizure, or a brain or other nervous system problem?   No   During the past year, have you received a transfusion of blood or blood    products, or been given immune (gamma) globulin or antiviral drug?   No   For women: Are you pregnant or is there a chance you could become       pregnant during the next month?   No   Have you received any vaccinations in the past 4 weeks?   No     Immunization questionnaire answers were all negative.      Patient instructed to remain in clinic for 15 minutes afterwards, and to report any adverse reactions.     Screening performed by Erica Anaya MA on 12/19/2023 at 2:39 PM.

## 2023-12-19 NOTE — PROGRESS NOTES
SUBJECTIVE:   Gretta is a 38 year old, presenting for the following:  Physical        2023     1:46 PM   Additional Questions   Roomed by [facility]       Healthy Habits:     Getting at least 3 servings of Calcium per day:  Yes    Bi-annual eye exam:  NO    Dental care twice a year:  Yes    Sleep apnea or symptoms of sleep apnea:  None    Diet:  Gluten-free/reduced    Frequency of exercise:  4-5 days/week    Duration of exercise:  30-45 minutes    Taking medications regularly:  Yes    Medication side effects:  None    Additional concerns today:  Yes    Hypothyroidism Follow-up    Since last visit, patient describes the following symptoms: Weight stable, no hair loss, no skin changes, no constipation, no loose stools    Notes potential lactose intolerance. Dairy causes bloating and discomfort. Avoiding dairy has her feeling much better.       Have you ever done Advance Care Planning? (For example, a Health Directive, POLST, or a discussion with a medical provider or your loved ones about your wishes): No, advance care planning information given to patient to review.  Patient plans to discuss their wishes with loved ones or provider.      Social History     Tobacco Use    Smoking status: Never     Passive exposure: Never    Smokeless tobacco: Never   Substance Use Topics    Alcohol use: Yes     Comment: occasional             2023    10:49 AM   Alcohol Use   Prescreen: >3 drinks/day or >7 drinks/week? No     Reviewed orders with patient.  Reviewed health maintenance and updated orders accordingly - Yes  BP Readings from Last 3 Encounters:   23 126/76   23 130/82   22 139/78    Wt Readings from Last 3 Encounters:   23 91.4 kg (201 lb 9 oz)   23 90.3 kg (199 lb)   22 88.8 kg (195 lb 11.2 oz)                    Breast Cancer Screenin/12/2023    10:51 AM   Breast CA Risk Assessment (FHS-7)   Do you have a family history of breast, colon, or ovarian cancer? No / Unknown  "      click delete button to remove this line now  Patient under 40 years of age: Routine Mammogram Screening not recommended.     Pertinent mammograms are reviewed under the imaging tab.    History of abnormal Pap smear: NO - age 30-65 PAP every 5 years with negative HPV co-testing recommended      Latest Ref Rng & Units 11/4/2020    12:00 AM 3/16/2016    10:59 AM 3/16/2016    12:00 AM   PAP / HPV   PAP (Historical)    NIL    HPV 16 DNA NEG  Negative     HPV 18 DNA NEG  Negative     Other HR HPV NEG  Negative     PAP-ABSTRACT  See Scanned Document             This result is from an external source.     Reviewed and updated as needed this visit by clinical staff   Tobacco  Allergies  Meds              Reviewed and updated as needed this visit by Provider                     Review of Systems   Constitutional:  Negative for chills and fever.   HENT:  Negative for congestion, ear pain, hearing loss and sore throat.    Eyes:  Negative for pain and visual disturbance.   Respiratory:  Negative for cough and shortness of breath.    Cardiovascular:  Negative for chest pain, palpitations and peripheral edema.   Gastrointestinal:  Negative for abdominal pain, constipation, diarrhea, heartburn, hematochezia and nausea.   Breasts:  Negative for tenderness, breast mass and discharge.   Genitourinary:  Negative for dysuria, frequency, genital sores, hematuria, pelvic pain, urgency, vaginal bleeding and vaginal discharge.   Musculoskeletal:  Negative for arthralgias, joint swelling and myalgias.   Skin:  Negative for rash.   Neurological:  Negative for dizziness, weakness, headaches and paresthesias.   Psychiatric/Behavioral:  Negative for mood changes. The patient is not nervous/anxious.           OBJECTIVE:   /76 (BP Location: Left arm, Patient Position: Chair, Cuff Size: Adult Regular)   Pulse 67   Temp 98.7  F (37.1  C) (Temporal)   Resp 16   Ht 1.861 m (6' 1.25\")   Wt 91.4 kg (201 lb 9 oz)   LMP 11/29/2023 " (Exact Date)   SpO2 100%   Breastfeeding No   BMI 26.41 kg/m    Physical Exam  GENERAL: healthy, alert and no distress  EYES: Eyes grossly normal to inspection, PERRL and conjunctivae and sclerae normal  HENT: ear canals and TM's normal, nose and mouth without ulcers or lesions  NECK: no adenopathy, no asymmetry, masses, or scars and thyroid normal to palpation  RESP: lungs clear to auscultation - no rales, rhonchi or wheezes  BREAST: normal without masses, tenderness or nipple discharge and no palpable axillary masses or adenopathy  CV: regular rate and rhythm, normal S1 S2, no S3 or S4, no murmur, click or rub, no peripheral edema and peripheral pulses strong  ABDOMEN: soft, nontender, no hepatosplenomegaly, no masses and bowel sounds normal  MS: no gross musculoskeletal defects noted, no edema  SKIN: no suspicious lesions or rashes  NEURO: Normal strength and tone, mentation intact and speech normal  PSYCH: mentation appears normal, affect normal/bright        ASSESSMENT/PLAN:   (Z00.00) Routine general medical examination at a health care facility  (primary encounter diagnosis)  Comment: See counseling messages    Plan: recheck in one year.     (E04.1) Thyroid nodule  Comment: due for recheck  Plan: TSH WITH FREE T4 REFLEX, US Thyroid, T4 free        Will notify of results.     (E03.4) Hypothyroidism due to acquired atrophy of thyroid  Comment: Awaiting results. Dose adjustment as needed.        (Z13.220) Lipid screening  Comment: Will notify of results.   Plan: Lipid panel reflex to direct LDL Fasting            (Z13.1) Screening for diabetes mellitus  Comment: Will notify of results.   Plan: Comprehensive metabolic panel (BMP + Alb, Alk         Phos, ALT, AST, Total. Bili, TP), CANCELED:         Basic metabolic panel  (Ca, Cl, CO2, Creat,         Gluc, K, Na, BUN)                  COUNSELING:  Reviewed preventive health counseling, as reflected in patient instructions       Regular exercise       Healthy  "diet/nutrition      BMI:   Estimated body mass index is 26.41 kg/m  as calculated from the following:    Height as of this encounter: 1.861 m (6' 1.25\").    Weight as of this encounter: 91.4 kg (201 lb 9 oz).     Weight management plan: Discussed healthy diet and exercise guidelines      She reports that she has never smoked. She has never been exposed to tobacco smoke. She has never used smokeless tobacco.            Lavinia Roque MD  [facility] [place]  "

## 2023-12-20 ENCOUNTER — MYC MEDICAL ADVICE (OUTPATIENT)
Dept: FAMILY MEDICINE | Facility: CLINIC | Age: 38
End: 2023-12-20
Payer: COMMERCIAL

## 2023-12-20 DIAGNOSIS — E03.4 HYPOTHYROIDISM DUE TO ACQUIRED ATROPHY OF THYROID: ICD-10-CM

## 2023-12-20 RX ORDER — LEVOTHYROXINE SODIUM 88 UG/1
88 TABLET ORAL DAILY
Qty: 90 TABLET | Refills: 0 | Status: SHIPPED | OUTPATIENT
Start: 2023-12-20 | End: 2024-02-15

## 2023-12-20 NOTE — TELEPHONE ENCOUNTER
Patient requesting a higher dose of levothyroxine. Please advise if needs lab only or a visit of some kind to discuss before adjusting.    Sarah Alfonso CMA (AAMA)

## 2023-12-26 ENCOUNTER — ANCILLARY PROCEDURE (OUTPATIENT)
Dept: ULTRASOUND IMAGING | Facility: CLINIC | Age: 38
End: 2023-12-26
Attending: FAMILY MEDICINE
Payer: COMMERCIAL

## 2023-12-26 DIAGNOSIS — E04.1 THYROID NODULE: ICD-10-CM

## 2023-12-26 PROCEDURE — 76536 US EXAM OF HEAD AND NECK: CPT | Mod: GC

## 2024-02-14 ENCOUNTER — LAB (OUTPATIENT)
Dept: LAB | Facility: CLINIC | Age: 39
End: 2024-02-14
Payer: COMMERCIAL

## 2024-02-14 DIAGNOSIS — E03.4 HYPOTHYROIDISM DUE TO ACQUIRED ATROPHY OF THYROID: ICD-10-CM

## 2024-02-14 LAB — TSH SERPL DL<=0.005 MIU/L-ACNC: 3.29 UIU/ML (ref 0.3–4.2)

## 2024-02-14 PROCEDURE — 84443 ASSAY THYROID STIM HORMONE: CPT

## 2024-02-14 PROCEDURE — 36415 COLL VENOUS BLD VENIPUNCTURE: CPT

## 2024-02-15 ENCOUNTER — TELEPHONE (OUTPATIENT)
Dept: FAMILY MEDICINE | Facility: CLINIC | Age: 39
End: 2024-02-15
Payer: COMMERCIAL

## 2024-02-15 DIAGNOSIS — E03.4 HYPOTHYROIDISM DUE TO ACQUIRED ATROPHY OF THYROID: ICD-10-CM

## 2024-02-15 RX ORDER — LEVOTHYROXINE SODIUM 88 UG/1
88 TABLET ORAL DAILY
Qty: 90 TABLET | Refills: 3 | Status: SHIPPED | OUTPATIENT
Start: 2024-02-15

## 2024-11-19 ENCOUNTER — PATIENT OUTREACH (OUTPATIENT)
Dept: CARE COORDINATION | Facility: CLINIC | Age: 39
End: 2024-11-19
Payer: COMMERCIAL

## 2024-12-03 ENCOUNTER — PATIENT OUTREACH (OUTPATIENT)
Dept: CARE COORDINATION | Facility: CLINIC | Age: 39
End: 2024-12-03
Payer: COMMERCIAL

## 2025-02-09 ENCOUNTER — HEALTH MAINTENANCE LETTER (OUTPATIENT)
Age: 40
End: 2025-02-09

## 2025-03-09 ENCOUNTER — HEALTH MAINTENANCE LETTER (OUTPATIENT)
Age: 40
End: 2025-03-09

## (undated) RX ORDER — FENTANYL CITRATE 50 UG/ML
INJECTION, SOLUTION INTRAMUSCULAR; INTRAVENOUS
Status: DISPENSED
Start: 2021-07-22